# Patient Record
Sex: FEMALE | Race: WHITE | Employment: UNEMPLOYED | URBAN - METROPOLITAN AREA
[De-identification: names, ages, dates, MRNs, and addresses within clinical notes are randomized per-mention and may not be internally consistent; named-entity substitution may affect disease eponyms.]

---

## 2018-01-02 ENCOUNTER — HOSPITAL ENCOUNTER (OUTPATIENT)
Age: 73
Setting detail: SPECIMEN
Discharge: HOME | End: 2018-01-02
Payer: COMMERCIAL

## 2018-01-02 ENCOUNTER — OFFICE VISIT (OUTPATIENT)
Dept: INTERNAL MEDICINE | Age: 73
End: 2018-01-02
Payer: COMMERCIAL

## 2018-01-02 VITALS
HEART RATE: 104 BPM | BODY MASS INDEX: 30.73 KG/M2 | DIASTOLIC BLOOD PRESSURE: 76 MMHG | SYSTOLIC BLOOD PRESSURE: 152 MMHG | WEIGHT: 180 LBS | HEIGHT: 64 IN

## 2018-01-02 DIAGNOSIS — R79.89 LOW VITAMIN D LEVEL: ICD-10-CM

## 2018-01-02 DIAGNOSIS — D69.6 THROMBOCYTOPENIA (HCC): ICD-10-CM

## 2018-01-02 DIAGNOSIS — F33.9 EPISODE OF RECURRENT MAJOR DEPRESSIVE DISORDER, UNSPECIFIED DEPRESSION EPISODE SEVERITY (HCC): ICD-10-CM

## 2018-01-02 DIAGNOSIS — E78.5 HYPERLIPIDEMIA, UNSPECIFIED HYPERLIPIDEMIA TYPE: ICD-10-CM

## 2018-01-02 DIAGNOSIS — E11.9 TYPE 2 DIABETES MELLITUS WITHOUT COMPLICATION, WITHOUT LONG-TERM CURRENT USE OF INSULIN (HCC): ICD-10-CM

## 2018-01-02 DIAGNOSIS — I10 ESSENTIAL HYPERTENSION: ICD-10-CM

## 2018-01-02 DIAGNOSIS — D64.9 ANEMIA, UNSPECIFIED TYPE: ICD-10-CM

## 2018-01-02 DIAGNOSIS — R29.6 RECURRENT FALLS: Primary | ICD-10-CM

## 2018-01-02 DIAGNOSIS — R53.1 GENERALIZED WEAKNESS: ICD-10-CM

## 2018-01-02 DIAGNOSIS — R29.6 RECURRENT FALLS: ICD-10-CM

## 2018-01-02 LAB
ABSOLUTE EOS #: 0.17 K/UL (ref 0–0.44)
ABSOLUTE IMMATURE GRANULOCYTE: <0.03 K/UL (ref 0–0.3)
ABSOLUTE LYMPH #: 1.24 K/UL (ref 1.1–3.7)
ABSOLUTE MONO #: 0.65 K/UL (ref 0.1–1.2)
ALBUMIN SERPL-MCNC: 3 G/DL (ref 3.5–5.2)
ALBUMIN/GLOBULIN RATIO: 0.8 (ref 1–2.5)
ALP BLD-CCNC: 98 U/L (ref 35–104)
ALT SERPL-CCNC: 15 U/L (ref 5–33)
ANION GAP SERPL CALCULATED.3IONS-SCNC: 14 MMOL/L (ref 9–17)
AST SERPL-CCNC: 36 U/L
BASOPHILS # BLD: 1 % (ref 0–2)
BASOPHILS ABSOLUTE: 0.06 K/UL (ref 0–0.2)
BILIRUB SERPL-MCNC: 3.82 MG/DL (ref 0.3–1.2)
BUN BLDV-MCNC: 9 MG/DL (ref 8–23)
BUN/CREAT BLD: ABNORMAL (ref 9–20)
CALCIUM SERPL-MCNC: 8.4 MG/DL (ref 8.6–10.4)
CHLORIDE BLD-SCNC: 103 MMOL/L (ref 98–107)
CO2: 22 MMOL/L (ref 20–31)
CREAT SERPL-MCNC: 0.97 MG/DL (ref 0.5–0.9)
CREATININE URINE: 403.4 MG/DL (ref 28–217)
DIFFERENTIAL TYPE: ABNORMAL
EOSINOPHILS RELATIVE PERCENT: 3 % (ref 1–4)
FOLATE: <2 NG/ML
GFR AFRICAN AMERICAN: >60 ML/MIN
GFR NON-AFRICAN AMERICAN: 56 ML/MIN
GFR SERPL CREATININE-BSD FRML MDRD: ABNORMAL ML/MIN/{1.73_M2}
GFR SERPL CREATININE-BSD FRML MDRD: ABNORMAL ML/MIN/{1.73_M2}
GLUCOSE BLD-MCNC: 145 MG/DL (ref 70–99)
HBA1C MFR BLD: 5.5 %
HCT VFR BLD CALC: 36.6 % (ref 36.3–47.1)
HEMOGLOBIN: 12.3 G/DL (ref 11.9–15.1)
IMMATURE GRANULOCYTES: 0 %
LYMPHOCYTES # BLD: 20 % (ref 24–43)
MCH RBC QN AUTO: 35.1 PG (ref 25.2–33.5)
MCHC RBC AUTO-ENTMCNC: 33.6 G/DL (ref 28.4–34.8)
MCV RBC AUTO: 104.6 FL (ref 82.6–102.9)
MICROALBUMIN/CREAT 24H UR: 44 MG/L
MICROALBUMIN/CREAT UR-RTO: 11 MCG/MG CREAT
MONOCYTES # BLD: 10 % (ref 3–12)
PDW BLD-RTO: 16.3 % (ref 11.8–14.4)
PLATELET # BLD: 57 K/UL (ref 138–453)
PLATELET ESTIMATE: ABNORMAL
PMV BLD AUTO: 12.1 FL (ref 8.1–13.5)
POTASSIUM SERPL-SCNC: 4.1 MMOL/L (ref 3.7–5.3)
RBC # BLD: 3.5 M/UL (ref 3.95–5.11)
RBC # BLD: ABNORMAL 10*6/UL
SEG NEUTROPHILS: 66 % (ref 36–65)
SEGMENTED NEUTROPHILS ABSOLUTE COUNT: 4.13 K/UL (ref 1.5–8.1)
SODIUM BLD-SCNC: 139 MMOL/L (ref 135–144)
TOTAL PROTEIN: 6.7 G/DL (ref 6.4–8.3)
TSH SERPL DL<=0.05 MIU/L-ACNC: 2.35 MIU/L (ref 0.3–5)
VITAMIN B-12: 1887 PG/ML (ref 232–1245)
VITAMIN D 25-HYDROXY: 26.6 NG/ML (ref 30–100)
WBC # BLD: 6.3 K/UL (ref 3.5–11.3)
WBC # BLD: ABNORMAL 10*3/UL

## 2018-01-02 PROCEDURE — 80053 COMPREHEN METABOLIC PANEL: CPT

## 2018-01-02 PROCEDURE — 82306 VITAMIN D 25 HYDROXY: CPT

## 2018-01-02 PROCEDURE — 85025 COMPLETE CBC W/AUTO DIFF WBC: CPT

## 2018-01-02 PROCEDURE — 83036 HEMOGLOBIN GLYCOSYLATED A1C: CPT | Performed by: INTERNAL MEDICINE

## 2018-01-02 PROCEDURE — 82570 ASSAY OF URINE CREATININE: CPT

## 2018-01-02 PROCEDURE — 82607 VITAMIN B-12: CPT

## 2018-01-02 PROCEDURE — 99214 OFFICE O/P EST MOD 30 MIN: CPT | Performed by: INTERNAL MEDICINE

## 2018-01-02 PROCEDURE — 36415 COLL VENOUS BLD VENIPUNCTURE: CPT

## 2018-01-02 PROCEDURE — 82746 ASSAY OF FOLIC ACID SERUM: CPT

## 2018-01-02 PROCEDURE — 84443 ASSAY THYROID STIM HORMONE: CPT

## 2018-01-02 PROCEDURE — 82043 UR ALBUMIN QUANTITATIVE: CPT

## 2018-01-02 ASSESSMENT — ENCOUNTER SYMPTOMS
NAUSEA: 0
ABDOMINAL PAIN: 0
SHORTNESS OF BREATH: 0
COUGH: 0
SPUTUM PRODUCTION: 0
EYE REDNESS: 0
BLURRED VISION: 0

## 2018-01-02 NOTE — PROGRESS NOTES
and physical therapy and a life alert button but patient has refused in the past.  Patient has 2 children one of whom is adopted. She has no contact with them for years. The only person she is in contact with is a niece who lives in [place]. She has living will . She wants no CPR or intubation. She had it notarized. She does not want intubation even for reversible reasons. Results for POC orders placed in visit on 01/02/18   POCT glycosylated hemoglobin (Hb A1C)   Result Value Ref Range    Hemoglobin A1C 5.5 %       Liver u/s:   IMPRESSION:    1. Echogenic foci in the gallbladder, may reflect non-shadowing calculi. 2. Slightly hypoechoic hepatic parenchyma, nonspecific though can be seen    in the setting of hepatitis. Please correlate clinically. No focal    lesions are demonstrated. 3. Spleen is normal in size and otherwise sonographically unremarkable.      Pathology review of blood: PERIPHERAL EXAMINATION BY PATHOLOGIST    PLATELETS: Normal.     LEUKOCYTES: Normal.    ERYTHROCYTES: Normal.      Results for POC orders placed in visit on 01/02/18   POCT glycosylated hemoglobin (Hb A1C)   Result Value Ref Range    Hemoglobin A1C 5.5 %         Past Medical History:   Diagnosis Date    Anxiety     Arthritis     Depression     HLD (hyperlipidemia) 2/28/2014    HTN (hypertension) 2/28/2014    Obesity     Osteoarthritis     knee    Osteopenia     dexa 2006 left hip    Type II or unspecified type diabetes mellitus without mention of complication, not stated as uncontrolled        Past Surgical History:   Procedure Laterality Date    SHOULDER SURGERY Right     metal plate         ALLERGIES    No Known Allergies    MEDICATIONS:      Current Outpatient Prescriptions on File Prior to Visit   Medication Sig Dispense Refill    sertraline (ZOLOFT) 100 MG tablet Take 100 mg by mouth 2 times daily.  traZODone (DESYREL) 50 MG tablet Take 50 mg by mouth as needed for Sleep.       acetaminophen Component Value Date    TSH 1.99 02/28/2014      URINE ANALYSIS: No results found for: LABURIN  ASSESSMENT AND PLAN:    1. Recurrent falls  Needs PT  Walker  Help with nutrition    - [address]  - Comprehensive Metabolic Panel; Future  - TSH with Reflex; Future  - Vitamin D 25 Hydroxy; Future    2. Type 2 diabetes mellitus without complication, without long-term current use of insulin (HCC)    - POCT glycosylated hemoglobin (Hb A1C)  - Microalbumin, Ur; Future  - [address]  - Comprehensive Metabolic Panel; Future  - Vitamin B12 & Folate; Future  - TSH with Reflex; Future    3. Essential hypertension    - [address]  - Comprehensive Metabolic Panel; Future  - TSH with Reflex; Future    4. Generalized weakness    - Vitamin D 25 Hydroxy; Future    5. Thrombocytopenia (HCC)  Chronic    - CBC With Auto Differential; Future  - Vitamin B12 & Folate; Future    6. Hyperlipidemia, unspecified hyperlipidemia type      7. Episode of recurrent major depressive disorder, unspecified depression episode severity [facility])    - [address]    8. Anemia, unspecified type    - CBC With Auto Differential; Future    9. Low vitamin D level    - Vitamin D 25 Hydroxy; Future          FOLLOW UP AND INSTRUCTIONS:   Return in about 4 weeks (around 1/30/2018). 1. Nina received counseling on the following healthy behaviors: nutrition, exercise and medication adherence    2. Reviewed prior labs and health maintenance. 3. Discussed use, benefit, and side effects of prescribed medications. Barriers to medication compliance addressed. All patient questions answered. Pt voiced understanding.        Estefany Conley  Attending Physician, [address] [place] [address], Internal Medicine Residency Program  [address] [place] [address]  1/2/2018, 1:43 PM

## 2018-01-02 NOTE — PROGRESS NOTES
Visit Information    Have you changed or started any medications since your last visit including any over-the-counter medicines, vitamins, or herbal medicines? no   Are you having any side effects from any of your medications? -  no  Have you stopped taking any of your medications? Is so, why? -  no    Have you seen any other physician or provider since your last visit? No  Have you had any other diagnostic tests since your last visit? No  Have you been seen in the emergency room and/or had an admission to a hospital since we last saw you? No  Have you had your routine dental cleaning in the past 6 months? no    Have you activated your [employer] account? If not, what are your barriers?  No: declined      Patient Care Team:  Sarah Rodriguez MD as PCP - Scoo Caldwell MD as Consulting Physician (Hematology and Oncology)    Medical History Review  Past Medical, Family, and Social History reviewed and does contribute to the patient presenting condition    Health Maintenance   Topic Date Due    Diabetic foot exam  09/12/1955    Diabetic retinal exam  09/12/1955    Colon cancer screen colonoscopy  09/12/1993    Lipid screen  12/29/2015    Diabetic hemoglobin A1C test  05/19/2016    Diabetic microalbuminuria test  05/19/2016    Breast cancer screen  09/03/2016    Pneumococcal low/med risk (2 of 2 - PPSV23) 09/21/2016    DTaP/Tdap/Td vaccine (1 - Tdap) 07/02/2018 (Originally 9/12/1964)    Zostavax vaccine  Addressed    DEXA (modify frequency per FRAX score)  Addressed    Flu vaccine  Completed

## 2018-01-03 RX ORDER — FOLIC ACID 1 MG/1
1 TABLET ORAL DAILY
Qty: 30 TABLET | Refills: 5 | Status: SHIPPED | OUTPATIENT
Start: 2018-01-03

## 2018-01-07 ASSESSMENT — ENCOUNTER SYMPTOMS
CONSTIPATION: 0
BACK PAIN: 0
DIARRHEA: 0
BLOOD IN STOOL: 0

## 2018-01-31 ENCOUNTER — APPOINTMENT (OUTPATIENT)
Dept: GENERAL RADIOLOGY | Age: 73
DRG: 086 | End: 2018-01-31
Payer: COMMERCIAL

## 2018-01-31 ENCOUNTER — HOSPITAL ENCOUNTER (EMERGENCY)
Age: 73
Discharge: HOME | DRG: 086 | End: 2018-01-31
Attending: EMERGENCY MEDICINE
Payer: COMMERCIAL

## 2018-01-31 VITALS
DIASTOLIC BLOOD PRESSURE: 81 MMHG | HEART RATE: 98 BPM | SYSTOLIC BLOOD PRESSURE: 134 MMHG | OXYGEN SATURATION: 99 % | RESPIRATION RATE: 18 BRPM | TEMPERATURE: 97.9 F

## 2018-01-31 DIAGNOSIS — S42.212A: Primary | ICD-10-CM

## 2018-01-31 PROCEDURE — 73030 X-RAY EXAM OF SHOULDER: CPT

## 2018-01-31 PROCEDURE — 73080 X-RAY EXAM OF ELBOW: CPT

## 2018-01-31 PROCEDURE — 73060 X-RAY EXAM OF HUMERUS: CPT

## 2018-01-31 PROCEDURE — 99284 EMERGENCY DEPT VISIT MOD MDM: CPT

## 2018-01-31 PROCEDURE — 6370000000 HC RX 637 (ALT 250 FOR IP): Performed by: EMERGENCY MEDICINE

## 2018-01-31 RX ORDER — IBUPROFEN 800 MG/1
800 TABLET ORAL EVERY 8 HOURS PRN
Qty: 30 TABLET | Refills: 0 | Status: SHIPPED | OUTPATIENT
Start: 2018-01-31

## 2018-01-31 RX ORDER — ACETAMINOPHEN 325 MG/1
650 TABLET ORAL ONCE
Status: COMPLETED | OUTPATIENT
Start: 2018-01-31 | End: 2018-01-31

## 2018-01-31 RX ORDER — HYDROCODONE BITARTRATE AND ACETAMINOPHEN 5; 325 MG/1; MG/1
1 TABLET ORAL EVERY 6 HOURS PRN
Qty: 12 TABLET | Refills: 0 | Status: ON HOLD | OUTPATIENT
Start: 2018-01-31 | End: 2018-02-06

## 2018-01-31 RX ADMIN — ACETAMINOPHEN 650 MG: 325 TABLET ORAL at 16:42

## 2018-01-31 ASSESSMENT — ENCOUNTER SYMPTOMS
NAUSEA: 0
ABDOMINAL PAIN: 0
CHEST TIGHTNESS: 0
VOMITING: 0
DIARRHEA: 0
CONSTIPATION: 0
SORE THROAT: 0
SHORTNESS OF BREATH: 0

## 2018-01-31 ASSESSMENT — PAIN SCALES - GENERAL: PAINLEVEL_OUTOF10: 6

## 2018-01-31 NOTE — PROGRESS NOTES
I signed up for this patient in error. I did not evaluate or participate in the care of this patient.      Marcela Maria, DO  Emergency Medicine Resident

## 2018-01-31 NOTE — ED PROVIDER NOTES
Main Topics    Smoking status: Former Smoker     Packs/day: 2.00     Years: 25.00    Smokeless tobacco: Never Used    Alcohol use No    Drug use: No      Comment: quit 1985    Sexual activity: Not on file     Other Topics Concern    Not on file     Social History Narrative    No narrative on file       Family History   Problem Relation Age of Onset    High Blood Pressure Mother     Other Father      cirrhosis       Allergies:  Review of patient's allergies indicates no known allergies. Home Medications:  Prior to Admission medications    Medication Sig Start Date End Date Taking? Authorizing Provider   HYDROcodone-acetaminophen (NORCO) 5-325 MG per tablet Take 1 tablet by mouth every 6 hours as needed for Pain for up to 7 days. 1/31/18 2/7/18 Yes Darius Holt MD   ibuprofen (ADVIL;MOTRIN) 800 MG tablet Take 1 tablet by mouth every 8 hours as needed for Pain 1/31/18  Yes Darius Holt MD   folic acid (FOLVITE) 1 MG tablet Take 1 tablet by mouth daily 1/3/18   Monica Lemon MD   Cholecalciferol 2000 units TABS Once daily 1/3/18   Monica Lemon MD   sertraline (ZOLOFT) 100 MG tablet Take 100 mg by mouth 2 times daily. Historical Provider, MD   traZODone (DESYREL) 50 MG tablet Take 50 mg by mouth as needed for Sleep. Historical Provider, MD   acetaminophen (TYLENOL) 500 MG tablet Take 500 mg by mouth every 4 hours as needed for Pain. Historical Provider, MD       REVIEW OF SYSTEMS    (2-9 systems for level 4, 10 or more for level 5)      Review of Systems   Constitutional: Negative for chills, diaphoresis and fever. HENT: Negative for congestion and sore throat. Respiratory: Negative for chest tightness and shortness of breath. Cardiovascular: Negative for chest pain and leg swelling. Gastrointestinal: Negative for abdominal pain, constipation, diarrhea, nausea and vomiting. Genitourinary: Negative for difficulty urinating, dysuria, frequency and urgency.    Musculoskeletal: This Encounter   Medications    acetaminophen (TYLENOL) tablet 650 mg    HYDROcodone-acetaminophen (NORCO) 5-325 MG per tablet     Sig: Take 1 tablet by mouth every 6 hours as needed for Pain for up to 7 days. Dispense:  12 tablet     Refill:  0    ibuprofen (ADVIL;MOTRIN) 800 MG tablet     Sig: Take 1 tablet by mouth every 8 hours as needed for Pain     Dispense:  30 tablet     Refill:  0       DDX: Fracture, dislocation, subluxation, ligamentous injury, rotator cuff injury    DIAGNOSTIC RESULTS / [address] [place] [address] / [facility]     LABS:  No results found for this visit on 01/31/18. IMPRESSION: 77-year-old female presenting with left arm pain. Patient denies any falls today. Denies any numbness or weakness in her arm. Neurovascularly intact on exam although patient does have decreased range of motion the left shoulder with abduction. Possible fracture or rotator cuff injury. May be a social component as patient does live alone. While  evaluate. Plan is for x-ray of the left humerus and left elbow, analgesia and reassessment. Will consult orthopedic surgery if there is a fracture. Likely discharge. RADIOLOGY:  Xr Humerus Left (min 2 Views)    Result Date: 1/31/2018  EXAMINATION: AP AND LATERAL VIEWS OF THE LEFT HUMERUS; 3 VIEWS OF THE LEFT ELBOW 1/31/2018 5:16 pm COMPARISON: None. HISTORY: ORDERING SYSTEM PROVIDED HISTORY: left arm pain just proximal to the elbow TECHNOLOGIST PROVIDED HISTORY: Reason for exam:->left arm pain just proximal to the elbow Ordering Physician Provided Reason for Exam: Arm Injury (pt states she almost fell but lowered herself to ground with controlled movements, pt states she wasn't able to get up until someone helped her up and was back to home.) Acuity: Acute Type of Exam: Unknown FINDINGS: There is a comminuted foreshortened fracture through the surgical neck of the left humerus.   There are hypertrophic degenerative changes of the acromioclavicular joint No joint effusion identified. Joint spaces appear maintained. Visualized osseous structures are moderately demineralized. Soft tissues have a normal radiographic appearance. 1. Impacted comminuted fracture through the surgical neck of the left humerus. 2. No radiographic evidence for acute osseous abnormality of the left elbow. Xr Elbow Left (min 3 Views)    Result Date: 1/31/2018  EXAMINATION: AP AND LATERAL VIEWS OF THE LEFT HUMERUS; 3 VIEWS OF THE LEFT ELBOW 1/31/2018 5:16 pm COMPARISON: None. HISTORY: ORDERING SYSTEM PROVIDED HISTORY: left arm pain just proximal to the elbow TECHNOLOGIST PROVIDED HISTORY: Reason for exam:->left arm pain just proximal to the elbow Ordering Physician Provided Reason for Exam: Arm Injury (pt states she almost fell but lowered herself to ground with controlled movements, pt states she wasn't able to get up until someone helped her up and was back to home.) Acuity: Acute Type of Exam: Unknown FINDINGS: There is a comminuted foreshortened fracture through the surgical neck of the left humerus. There are hypertrophic degenerative changes of the acromioclavicular joint No joint effusion identified. Joint spaces appear maintained. Visualized osseous structures are moderately demineralized. Soft tissues have a normal radiographic appearance. 1. Impacted comminuted fracture through the surgical neck of the left humerus. 2. No radiographic evidence for acute osseous abnormality of the left elbow. EKG  None    All EKG's are interpreted by the Emergency Department Physician who either signs or Co-signs this chart in the absence of a cardiologist.    EMERGENCY DEPARTMENT COURSE:  ED Course as of Jan 31 1805   Wed Jan 31, 2018   1719 Spoke with orthopedic surgery to discuss care plan and need for evaluation as patient does have fractured left humerus. Ortho will evaluate the patient in emergency room.   Patient offered narcotic pain medication for her fracture but states she is not in any pain at this time. Patient declined. [DS]   6489 With the surgery did evaluate patient. They state patient be placed in a sling and discharged to follow up outpatient with Dr. Suleiman Mcghee in 1 week. [DS]      ED Course User Index  [DS] Guillermo Dahl MD           PROCEDURES:  None    CONSULTS:  IP CONSULT TO SOCIAL WORK  IP CONSULT TO ORTHOPEDIC SURGERY    CRITICAL CARE:  None    FINAL IMPRESSION      1. Closed fracture of surgical neck of humerus, left, closed, initial encounter          DISPOSITION / PLAN     DISPOSITION Decision To Discharge 01/31/2018 06:02:41 PM      PATIENT REFERRED TO:  MD Yue Catalan [facility] [address] [facility] 400 [facility] 909 [phone]    Call in 1 day  For a follow up appointment. Amalia Chua DO  [facility] [address], [place] [address]  [phone]    Call in 1 day  For a follow up appointment in 1 week. DISCHARGE MEDICATIONS:  New Prescriptions    HYDROCODONE-ACETAMINOPHEN (NORCO) 5-325 MG PER TABLET    Take 1 tablet by mouth every 6 hours as needed for Pain for up to 7 days.     IBUPROFEN (ADVIL;MOTRIN) 800 MG TABLET    Take 1 tablet by mouth every 8 hours as needed for Pain       Guillermo Dahl MD  Emergency Medicine Resident    (Please note that portions of this note were completed with a voice recognition program.  Efforts were made to edit the dictations but occasionally words ar      Guillermo Dahl MD  Resident  01/31/18 0765

## 2018-01-31 NOTE — CONSULTS
Admission:   Prior to Admission medications    Medication Sig Start Date End Date Taking? Authorizing Provider   folic acid (FOLVITE) 1 MG tablet Take 1 tablet by mouth daily 1/3/18   Veda Ortiz MD   Cholecalciferol 2000 units TABS Once daily 1/3/18   Veda Ortiz MD   sertraline (ZOLOFT) 100 MG tablet Take 100 mg by mouth 2 times daily. Historical Provider, MD   traZODone (DESYREL) 50 MG tablet Take 50 mg by mouth as needed for Sleep. Historical Provider, MD   acetaminophen (TYLENOL) 500 MG tablet Take 500 mg by mouth every 4 hours as needed for Pain. Historical Provider, MD     Allergies:    Review of patient's allergies indicates no known allergies. Social History:   Social History     Social History    Marital status:      Spouse name: N/A    Number of children: N/A    Years of education: N/A     Social History Main Topics    Smoking status: Former Smoker     Packs/day: 2.00     Years: 25.00    Smokeless tobacco: Never Used    Alcohol use No    Drug use: No      Comment: quit 1985    Sexual activity: Not Asked     Other Topics Concern    None     Social History Narrative    None     Family History:  Family History   Problem Relation Age of Onset    High Blood Pressure Mother     Other Father      cirrhosis     REVIEW OF SYSTEMS:    Constitutional: Negative for fever and chills. HENT: Negative for congestion. Eyes: Negative for blurred vision and double vision. Respiratory: Negative for cough, shortness of breath and wheezing. Cardiovascular: Negative for chest pain and palpitations. Gastrointestinal: Negative for nausea. Negative for vomiting. Musculoskeletal: Positive for myalgias and left shoulder pain. Skin: Negative for itching and rash. Neurological: Negative for dizziness, sensory change and headaches. Psychiatric/Behavioral: Negative for depression and suicidal ideas.      PHYSICAL EXAM:  /81   Pulse 98   Temp 97.9 °F (36.6 °C)   Resp

## 2018-02-01 ENCOUNTER — APPOINTMENT (OUTPATIENT)
Dept: GENERAL RADIOLOGY | Age: 73
DRG: 086 | End: 2018-02-01
Payer: COMMERCIAL

## 2018-02-01 ENCOUNTER — APPOINTMENT (OUTPATIENT)
Dept: CT IMAGING | Age: 73
DRG: 086 | End: 2018-02-01
Payer: COMMERCIAL

## 2018-02-01 ENCOUNTER — APPOINTMENT (OUTPATIENT)
Dept: ULTRASOUND IMAGING | Age: 73
DRG: 086 | End: 2018-02-01
Payer: COMMERCIAL

## 2018-02-01 ENCOUNTER — HOSPITAL ENCOUNTER (INPATIENT)
Age: 73
LOS: 5 days | Discharge: NURSING, REHAB OR LONG-TERM CARE | DRG: 086 | End: 2018-02-06
Attending: EMERGENCY MEDICINE | Admitting: INTERNAL MEDICINE
Payer: COMMERCIAL

## 2018-02-01 DIAGNOSIS — S42.212A: ICD-10-CM

## 2018-02-01 DIAGNOSIS — E87.20 LACTIC ACIDOSIS: ICD-10-CM

## 2018-02-01 DIAGNOSIS — S42.215A CLOSED NONDISPLACED FRACTURE OF SURGICAL NECK OF LEFT HUMERUS, UNSPECIFIED FRACTURE MORPHOLOGY, INITIAL ENCOUNTER: ICD-10-CM

## 2018-02-01 DIAGNOSIS — N39.0 SEPSIS DUE TO URINARY TRACT INFECTION (HCC): Primary | ICD-10-CM

## 2018-02-01 DIAGNOSIS — A41.9 SEPSIS DUE TO URINARY TRACT INFECTION (HCC): Primary | ICD-10-CM

## 2018-02-01 DIAGNOSIS — I61.9 INTRAPARENCHYMAL HEMORRHAGE OF BRAIN (HCC): ICD-10-CM

## 2018-02-01 DIAGNOSIS — D50.9 IRON DEFICIENCY ANEMIA, UNSPECIFIED IRON DEFICIENCY ANEMIA TYPE: ICD-10-CM

## 2018-02-01 PROBLEM — R79.89 ELEVATED LFTS: Status: ACTIVE | Noted: 2018-02-01

## 2018-02-01 PROBLEM — R29.6 RECURRENT FALLS: Status: ACTIVE | Noted: 2018-02-01

## 2018-02-01 PROBLEM — D69.6 THROMBOCYTOPENIA (HCC): Status: ACTIVE | Noted: 2018-02-01

## 2018-02-01 LAB
-: ABNORMAL
ABSOLUTE EOS #: <0.03 K/UL (ref 0–0.44)
ABSOLUTE IMMATURE GRANULOCYTE: 0.11 K/UL (ref 0–0.3)
ABSOLUTE LYMPH #: 0.84 K/UL (ref 1.1–3.7)
ABSOLUTE MONO #: 1.03 K/UL (ref 0.1–1.2)
ALBUMIN SERPL-MCNC: 2.9 G/DL (ref 3.5–5.2)
ALBUMIN/GLOBULIN RATIO: 0.9 (ref 1–2.5)
ALP BLD-CCNC: 83 U/L (ref 35–104)
ALT SERPL-CCNC: 20 U/L (ref 5–33)
AMORPHOUS: ABNORMAL
ANION GAP SERPL CALCULATED.3IONS-SCNC: 19 MMOL/L (ref 9–17)
AST SERPL-CCNC: 75 U/L
BACTERIA: ABNORMAL
BASOPHILS # BLD: 0 % (ref 0–2)
BASOPHILS ABSOLUTE: <0.03 K/UL (ref 0–0.2)
BILIRUB SERPL-MCNC: 3.77 MG/DL (ref 0.3–1.2)
BILIRUBIN URINE: ABNORMAL
BUN BLDV-MCNC: 18 MG/DL (ref 8–23)
BUN/CREAT BLD: ABNORMAL (ref 9–20)
CALCIUM SERPL-MCNC: 8.9 MG/DL (ref 8.6–10.4)
CASTS UA: ABNORMAL /LPF (ref 0–2)
CHLORIDE BLD-SCNC: 98 MMOL/L (ref 98–107)
CO2: 15 MMOL/L (ref 20–31)
COLOR: ABNORMAL
CREAT SERPL-MCNC: 0.89 MG/DL (ref 0.5–0.9)
CRYSTALS, UA: ABNORMAL /HPF
DIFFERENTIAL TYPE: ABNORMAL
EKG ATRIAL RATE: 98 BPM
EKG P AXIS: 27 DEGREES
EKG P-R INTERVAL: 132 MS
EKG Q-T INTERVAL: 352 MS
EKG QRS DURATION: 64 MS
EKG QTC CALCULATION (BAZETT): 449 MS
EKG R AXIS: -17 DEGREES
EKG T AXIS: 44 DEGREES
EKG VENTRICULAR RATE: 98 BPM
EOSINOPHILS RELATIVE PERCENT: 0 % (ref 1–4)
EPITHELIAL CELLS UA: ABNORMAL /HPF (ref 0–5)
GFR AFRICAN AMERICAN: >60 ML/MIN
GFR NON-AFRICAN AMERICAN: >60 ML/MIN
GFR SERPL CREATININE-BSD FRML MDRD: ABNORMAL ML/MIN/{1.73_M2}
GFR SERPL CREATININE-BSD FRML MDRD: ABNORMAL ML/MIN/{1.73_M2}
GLUCOSE BLD-MCNC: 179 MG/DL (ref 70–99)
GLUCOSE URINE: NEGATIVE
HCT VFR BLD CALC: 28.2 % (ref 36.3–47.1)
HEMOGLOBIN: 10.2 G/DL (ref 11.9–15.1)
IMMATURE GRANULOCYTES: 1 %
INR BLD: 1.5
KETONES, URINE: NEGATIVE
LACTIC ACID, WHOLE BLOOD: 3.5 MMOL/L (ref 0.7–2.1)
LACTIC ACID, WHOLE BLOOD: 5.9 MMOL/L (ref 0.7–2.1)
LACTIC ACID: ABNORMAL MMOL/L
LEUKOCYTE ESTERASE, URINE: ABNORMAL
LYMPHOCYTES # BLD: 6 % (ref 24–43)
MCH RBC QN AUTO: 37.8 PG (ref 25.2–33.5)
MCHC RBC AUTO-ENTMCNC: 36.2 G/DL (ref 28.4–34.8)
MCV RBC AUTO: 104.4 FL (ref 82.6–102.9)
MONOCYTES # BLD: 7 % (ref 3–12)
MUCUS: ABNORMAL
NITRITE, URINE: POSITIVE
NRBC AUTOMATED: 0 PER 100 WBC
OTHER OBSERVATIONS UA: ABNORMAL
PARTIAL THROMBOPLASTIN TIME: 31.5 SEC (ref 21.3–31.3)
PDW BLD-RTO: 18.6 % (ref 11.8–14.4)
PH UA: 5 (ref 5–8)
PLATELET # BLD: 67 K/UL (ref 138–453)
PLATELET ESTIMATE: ABNORMAL
PMV BLD AUTO: 12.4 FL (ref 8.1–13.5)
POC TROPONIN I: 0.01 NG/ML (ref 0–0.1)
POC TROPONIN I: 0.06 NG/ML (ref 0–0.1)
POC TROPONIN INTERP: NORMAL
POC TROPONIN INTERP: NORMAL
POTASSIUM SERPL-SCNC: 4.5 MMOL/L (ref 3.7–5.3)
PROTEIN UA: ABNORMAL
PROTHROMBIN TIME: 16.6 SEC (ref 9.4–12.6)
RBC # BLD: 2.7 M/UL (ref 3.95–5.11)
RBC # BLD: ABNORMAL 10*6/UL
RBC UA: ABNORMAL /HPF (ref 0–2)
RENAL EPITHELIAL, UA: ABNORMAL /HPF
SEG NEUTROPHILS: 86 % (ref 36–65)
SEGMENTED NEUTROPHILS ABSOLUTE COUNT: 12.19 K/UL (ref 1.5–8.1)
SODIUM BLD-SCNC: 132 MMOL/L (ref 135–144)
SPECIFIC GRAVITY UA: 1.04 (ref 1–1.03)
TOTAL PROTEIN: 6.3 G/DL (ref 6.4–8.3)
TRICHOMONAS: ABNORMAL
TURBIDITY: ABNORMAL
URINE HGB: ABNORMAL
UROBILINOGEN, URINE: NORMAL
WBC # BLD: 14.2 K/UL (ref 3.5–11.3)
WBC # BLD: ABNORMAL 10*3/UL
WBC UA: ABNORMAL /HPF (ref 0–5)
YEAST: ABNORMAL

## 2018-02-01 PROCEDURE — 76705 ECHO EXAM OF ABDOMEN: CPT

## 2018-02-01 PROCEDURE — 6370000000 HC RX 637 (ALT 250 FOR IP): Performed by: STUDENT IN AN ORGANIZED HEALTH CARE EDUCATION/TRAINING PROGRAM

## 2018-02-01 PROCEDURE — 82746 ASSAY OF FOLIC ACID SERUM: CPT

## 2018-02-01 PROCEDURE — 87040 BLOOD CULTURE FOR BACTERIA: CPT

## 2018-02-01 PROCEDURE — 72125 CT NECK SPINE W/O DYE: CPT

## 2018-02-01 PROCEDURE — 84484 ASSAY OF TROPONIN QUANT: CPT

## 2018-02-01 PROCEDURE — 82607 VITAMIN B-12: CPT

## 2018-02-01 PROCEDURE — 70450 CT HEAD/BRAIN W/O DYE: CPT

## 2018-02-01 PROCEDURE — 83605 ASSAY OF LACTIC ACID: CPT

## 2018-02-01 PROCEDURE — 85610 PROTHROMBIN TIME: CPT

## 2018-02-01 PROCEDURE — 36415 COLL VENOUS BLD VENIPUNCTURE: CPT

## 2018-02-01 PROCEDURE — 80074 ACUTE HEPATITIS PANEL: CPT

## 2018-02-01 PROCEDURE — 84443 ASSAY THYROID STIM HORMONE: CPT

## 2018-02-01 PROCEDURE — 83540 ASSAY OF IRON: CPT

## 2018-02-01 PROCEDURE — 6360000002 HC RX W HCPCS: Performed by: EMERGENCY MEDICINE

## 2018-02-01 PROCEDURE — 2060000000 HC ICU INTERMEDIATE R&B

## 2018-02-01 PROCEDURE — 83550 IRON BINDING TEST: CPT

## 2018-02-01 PROCEDURE — 94762 N-INVAS EAR/PLS OXIMTRY CONT: CPT

## 2018-02-01 PROCEDURE — 81001 URINALYSIS AUTO W/SCOPE: CPT

## 2018-02-01 PROCEDURE — 99223 1ST HOSP IP/OBS HIGH 75: CPT | Performed by: INTERNAL MEDICINE

## 2018-02-01 PROCEDURE — 2580000003 HC RX 258: Performed by: EMERGENCY MEDICINE

## 2018-02-01 PROCEDURE — 87086 URINE CULTURE/COLONY COUNT: CPT

## 2018-02-01 PROCEDURE — 80053 COMPREHEN METABOLIC PANEL: CPT

## 2018-02-01 PROCEDURE — 85025 COMPLETE CBC W/AUTO DIFF WBC: CPT

## 2018-02-01 PROCEDURE — 2580000003 HC RX 258: Performed by: INTERNAL MEDICINE

## 2018-02-01 PROCEDURE — 2580000003 HC RX 258: Performed by: STUDENT IN AN ORGANIZED HEALTH CARE EDUCATION/TRAINING PROGRAM

## 2018-02-01 PROCEDURE — 93005 ELECTROCARDIOGRAM TRACING: CPT

## 2018-02-01 PROCEDURE — 85730 THROMBOPLASTIN TIME PARTIAL: CPT

## 2018-02-01 PROCEDURE — 71045 X-RAY EXAM CHEST 1 VIEW: CPT

## 2018-02-01 PROCEDURE — 99285 EMERGENCY DEPT VISIT HI MDM: CPT

## 2018-02-01 RX ORDER — SODIUM CHLORIDE 0.9 % (FLUSH) 0.9 %
10 SYRINGE (ML) INJECTION PRN
Status: DISCONTINUED | OUTPATIENT
Start: 2018-02-01 | End: 2018-02-01

## 2018-02-01 RX ORDER — SODIUM CHLORIDE 0.9 % (FLUSH) 0.9 %
10 SYRINGE (ML) INJECTION EVERY 12 HOURS SCHEDULED
Status: DISCONTINUED | OUTPATIENT
Start: 2018-02-01 | End: 2018-02-06 | Stop reason: HOSPADM

## 2018-02-01 RX ORDER — FOLIC ACID 1 MG/1
1 TABLET ORAL DAILY
Status: DISCONTINUED | OUTPATIENT
Start: 2018-02-01 | End: 2018-02-02

## 2018-02-01 RX ORDER — ACETAMINOPHEN 325 MG/1
650 TABLET ORAL EVERY 4 HOURS PRN
Status: DISCONTINUED | OUTPATIENT
Start: 2018-02-01 | End: 2018-02-01

## 2018-02-01 RX ORDER — SODIUM CHLORIDE 0.9 % (FLUSH) 0.9 %
10 SYRINGE (ML) INJECTION PRN
Status: DISCONTINUED | OUTPATIENT
Start: 2018-02-01 | End: 2018-02-06 | Stop reason: HOSPADM

## 2018-02-01 RX ORDER — ONDANSETRON 2 MG/ML
4 INJECTION INTRAMUSCULAR; INTRAVENOUS EVERY 6 HOURS PRN
Status: DISCONTINUED | OUTPATIENT
Start: 2018-02-01 | End: 2018-02-06 | Stop reason: HOSPADM

## 2018-02-01 RX ORDER — 0.9 % SODIUM CHLORIDE 0.9 %
30 INTRAVENOUS SOLUTION INTRAVENOUS ONCE
Status: DISCONTINUED | OUTPATIENT
Start: 2018-02-01 | End: 2018-02-06 | Stop reason: HOSPADM

## 2018-02-01 RX ORDER — ACETAMINOPHEN 325 MG/1
650 TABLET ORAL EVERY 4 HOURS PRN
Status: DISCONTINUED | OUTPATIENT
Start: 2018-02-01 | End: 2018-02-06 | Stop reason: HOSPADM

## 2018-02-01 RX ORDER — 0.9 % SODIUM CHLORIDE 0.9 %
30 INTRAVENOUS SOLUTION INTRAVENOUS ONCE
Status: COMPLETED | OUTPATIENT
Start: 2018-02-01 | End: 2018-02-01

## 2018-02-01 RX ORDER — SODIUM CHLORIDE 0.9 % (FLUSH) 0.9 %
10 SYRINGE (ML) INJECTION EVERY 12 HOURS SCHEDULED
Status: DISCONTINUED | OUTPATIENT
Start: 2018-02-01 | End: 2018-02-01

## 2018-02-01 RX ORDER — SODIUM CHLORIDE 9 MG/ML
INJECTION, SOLUTION INTRAVENOUS CONTINUOUS
Status: DISCONTINUED | OUTPATIENT
Start: 2018-02-01 | End: 2018-02-02

## 2018-02-01 RX ADMIN — FOLIC ACID 1 MG: 1 TABLET ORAL at 20:15

## 2018-02-01 RX ADMIN — CEFTRIAXONE SODIUM 1 G: 1 INJECTION, POWDER, FOR SOLUTION INTRAMUSCULAR; INTRAVENOUS at 13:26

## 2018-02-01 RX ADMIN — Medication 10 ML: at 20:16

## 2018-02-01 RX ADMIN — SODIUM CHLORIDE 2448 ML: 9 INJECTION, SOLUTION INTRAVENOUS at 12:58

## 2018-02-01 RX ADMIN — SODIUM CHLORIDE: 9 INJECTION, SOLUTION INTRAVENOUS at 16:37

## 2018-02-01 ASSESSMENT — PAIN SCALES - GENERAL
PAINLEVEL_OUTOF10: 0
PAINLEVEL_OUTOF10: 7

## 2018-02-01 ASSESSMENT — ENCOUNTER SYMPTOMS
ABDOMINAL PAIN: 0
COUGH: 0
RHINORRHEA: 0
VOMITING: 0
NAUSEA: 0
SHORTNESS OF BREATH: 0
WHEEZING: 0
COLOR CHANGE: 0

## 2018-02-01 ASSESSMENT — PAIN DESCRIPTION - ORIENTATION: ORIENTATION: LEFT

## 2018-02-01 ASSESSMENT — PAIN DESCRIPTION - LOCATION: LOCATION: ARM

## 2018-02-01 ASSESSMENT — PAIN DESCRIPTION - FREQUENCY: FREQUENCY: CONTINUOUS

## 2018-02-01 ASSESSMENT — PAIN DESCRIPTION - PAIN TYPE: TYPE: ACUTE PAIN

## 2018-02-01 NOTE — H&P
Chest was symmetrical without dullness to percussion. Breath sounds bilaterally were clear to auscultation. There were no wheezes, rhonchi or rales. There is no intercostal retraction or use of accessory muscles. No egophony noted. Cardiovascular: Ejection systolic murmur with radiation to carotids. , . Abdomen: Slightly rounded and soft without organomegaly. No rebound, rigidity or guarding was appreciated. Lymphatic: No lymphadenopathy. Musculoskeletal: Normal curvature of the spine. No gross muscle weakness, left arm in sling   Extremities:  No lower extremity edema, ulcerations, tenderness, varicosities or erythema. Muscle size, tone and strength are normal.  No involuntary movements are noted. Skin:  Dry , poor capillary refill ,poor turgor. No lesions or scars. No cyanosis or clubbing  Neurological/Psychiatric: The patient's general behavior, level of consciousness, thought content and emotional status is normal.            Laboratory findings:-    CBC:   Recent Labs      02/01/18   1233   WBC  14.2*   HGB  10.2*   PLT  67*     BMP:    Recent Labs      02/01/18   1138   NA  132*   K  4.5   CL  98   CO2  15*   BUN  18   CREATININE  0.89   GLUCOSE  179*     S. Calcium:  Recent Labs      02/01/18   1138   CALCIUM  8.9     S. Ionized Calcium:No results for input(s): IONCA in the last 72 hours. S. Magnesium:No results for input(s): MG in the last 72 hours. S. Phosphorus:No results for input(s): PHOS in the last 72 hours. S. Glucose:No results for input(s): POCGLU in the last 72 hours. Glycosylated hemoglobin A1C: No results for input(s): LABA1C in the last 72 hours. INR:   Recent Labs      02/01/18   1316   INR  1.5     Hepatic functions:   Recent Labs      02/01/18   1138   ALKPHOS  83   ALT  20   AST  75*   PROT  6.3*   BILITOT  3.77*   LABALBU  2.9*     Pancreatic functions:No results for input(s): LACTA, AMYLASE in the last 72 hours.   S. Lactic Acid: No results for input(s): LACTA in the last 72 hours. Cardiac enzymes:  Recent Labs      02/01/18   1211  02/01/18   1405   TROPONINI  0.01  0.06     BNP:No results for input(s): BNP in the last 72 hours. Lipid profile: No results for input(s): CHOL, TRIG, HDL, LDLCALC in the last 72 hours. Invalid input(s): LDL  Blood Gases: No results found for: PH, PCO2, PO2, HCO3, O2SAT  Thyroid functions:   Lab Results   Component Value Date    TSH 2.35 01/02/2018        Radiological findings:-   CT head 2/1/18      Small parenchymal contusion in the posterior right frontal lobe.  No midline   shift or extra-axial fluid collection. CXR portable : 2/1/18   No acute cardiopulmonary findings.       Displaced proximal left humeral fracture with dislocation of the humeral head   from the glenoid. Ekg reviewed : no acute changes       Assessment and Plan       Assessment:    Active Problems:    Lactic acidosis    Intraparenchymal hemorrhage of brain (HCC)    Recurrent falls    Thrombocytopenia (HCC)    Sepsis (HCC)    Elevated LFTs       Plan:    1. Start IVF @ 100 ml/hr , blood cx x 2 , Urine cx   2. Start rocephin 2 g q 24 , UA positive for UTI, culture   3. Order 2d echo , Ekg non specific changes , nothing acute , draw blood trop x 2 , pt has grade 3 ejection systolic murmur   4. Check LFTs , CMP shows elevated AST and bilrubin but normal ALP , get abdominal USG , to evaluate spleen and liver , GB   5. Thrombocytopenia chronic , check peripheral smear,  TSH with relfex , LFTs , abdominal USG to evaluate spleen and liver , b12 and folate , check hepatitis panel   6. Trend lactic acidosis x 3   7. Check orthostatic vitals , compression stockings if positive   8. Hold dvt prophylaxis due to thrombocytopenia , place SCD   9. NS on board for intraparenchymal contusion , continue to monitor for now         Please note that this chart was generated using voice recognition dictation software.   Although every effort was made to ensure the accuracy of this automated

## 2018-02-01 NOTE — ED NOTES
Bed: 35  Expected date:   Expected time:   Means of arrival:   Comments:  [id number] Tyrone Hendrix RN  02/01/18 1109

## 2018-02-01 NOTE — FLOWSHEET NOTE
[address] [facility] [place] [address]     Emergency/Trauma Note    PATIENT NAME: Claudetta Gasmen    Shift date: 2/1/18  Shift day: Thursday   Shift # 1    Room # ERICK/ERICK   Name: Claudetta Gasmen            Age: 74 y.o. Gender: female          Rastafarian: [address] of Quaker: none    Trauma/Incident type: Fall from Bed (2n fall in 2 days)      Adult Trauma Consult  Admit Date & Time: 2/1/2018 11:01 AM        PATIENT/EVENT DESCRIPTION:  Claudetta Gasmen is a 74 y.o. female who arrived via EMS  from [facility]. Patient received a contusion to the brain and is being held for observation. Rich Moss SPIRITUAL ASSESSMENT/INTERVENTION:  Patient lives alone and has no family. She has a  whom she relates to. Patient was happy to share her life story and had only one concern to get home to her apartment. PATIENT BELONGINGS:  No belongings noted    ANY BELONGINGS OF SIGNIFICANT VALUE NOTED:  None    REGISTRATION STAFF NOTIFIED?         WHAT IS YOUR SPIRITUAL CARE PLAN FOR THIS PATIENT?:  Spiritual Care is ready if the patients need spiritual or emotional support       02/01/18 1420   Encounter Summary   Services provided to: Patient   Referral/Consult From: Multi-disciplinary team   Support System /   Place of Sabianism None  (Anabaptist)   Continue Visiting (2/1/18)   Complexity of Encounter High   Length of Encounter 2 hours   Spiritual Assessment Completed Yes   Crisis   Type Trauma   Assessment Approachable;Peaceful   Intervention Active listening;Explored feelings, thoughts, concerns;Explored coping resources;Prayer;Sustaining presence/ Ministry of presence   Outcome Expressed gratitude       Electronically signed by Chaplain Andree on 2/1/2018 at 3:44 PM.

## 2018-02-01 NOTE — ED PROVIDER NOTES
(1.626 m)   Wt 180 lb (81.6 kg)   SpO2 99%   BMI 30.90 kg/m²     Physical Exam   Constitutional: She is oriented to person, place, and time. She appears well-developed and well-nourished. HENT:   Head: Normocephalic and atraumatic. Eyes: Conjunctivae and EOM are normal.   Neck: Normal range of motion. No tenderness to palpation of the cervical spinous processes. Cardiovascular: Normal rate, regular rhythm and normal heart sounds. Exam reveals no gallop and no friction rub. No murmur heard. Pulmonary/Chest: Effort normal and breath sounds normal. No respiratory distress. She has no wheezes. She has no rales. Abdominal: Soft. She exhibits no distension. There is no tenderness. There is no rebound. Musculoskeletal: Normal range of motion. She exhibits no edema. Sling in place from previous humeral neck fracture on the left. Neurological: She is alert and oriented to person, place, and time. Skin: Skin is warm and dry. No rash noted. No erythema. Psychiatric: She has a normal mood and affect.  Thought content normal.       DIFFERENTIAL  DIAGNOSIS     PLAN (LABS / IMAGING / EKG):  Orders Placed This Encounter   Procedures    Urine Culture    Culture Blood #1    Culture Blood #1    Urine Culture    CT Head WO Contrast    CT CERVICAL SPINE WO CONTRAST    XR CHEST PORTABLE    Comprehensive Metabolic Panel    Urinalysis with microscopic    Lactic Acid, Whole Blood    CBC Auto Differential    APTT    Protime-INR    Lactic Acid, Plasma    Telemetry monitoring    Inpatient consult to Trauma Surgery    Inpatient consult to Neurosurgery    Inpatient consult to Internal Medicine    Pulse oximetry, continuous    POCT Troponin I    POCT troponin    POCT troponin    EKG 12 Lead    Insert peripheral IV    PATIENT STATUS (FROM ED OR OR/PROCEDURAL) Inpatient       MEDICATIONS ORDERED:  Orders Placed This Encounter   Medications    0.9 % sodium chloride bolus    cefTRIAXone (ROCEPHIN) Result Value Ref Range    Lactic Acid, Whole Blood 5.9 (H) 0.7 - 2.1 mmol/L   CBC Auto Differential   Result Value Ref Range    WBC 14.2 (H) 3.5 - 11.3 k/uL    RBC 2.70 (L) 3.95 - 5.11 m/uL    Hemoglobin 10.2 (L) 11.9 - 15.1 g/dL    Hematocrit 28.2 (L) 36.3 - 47.1 %    .4 (H) 82.6 - 102.9 fL    MCH 37.8 (H) 25.2 - 33.5 pg    MCHC 36.2 (H) 28.4 - 34.8 g/dL    RDW 18.6 (H) 11.8 - 14.4 %    Platelets 67 (L) 071 - 453 k/uL    MPV 12.4 8.1 - 13.5 fL    NRBC Automated 0.0 0.0 per 100 WBC    Differential Type NOT REPORTED     Seg Neutrophils 86 (H) 36 - 65 %    Lymphocytes 6 (L) 24 - 43 %    Monocytes 7 3 - 12 %    Eosinophils % 0 (L) 1 - 4 %    Basophils 0 0 - 2 %    Immature Granulocytes 1 (H) 0 %    Segs Absolute 12.19 (H) 1.50 - 8.10 k/uL    Absolute Lymph # 0.84 (L) 1.10 - 3.70 k/uL    Absolute Mono # 1.03 0.10 - 1.20 k/uL    Absolute Eos # <0.03 0.00 - 0.44 k/uL    Basophils # <0.03 0.00 - 0.20 k/uL    Absolute Immature Granulocyte 0.11 0.00 - 0.30 k/uL    WBC Morphology NOT REPORTED     RBC Morphology ANISOCYTOSIS PRESENT     Platelet Estimate NOT REPORTED    APTT   Result Value Ref Range    PTT 31.5 (H) 21.3 - 31.3 sec   Protime-INR   Result Value Ref Range    Protime 16.6 (H) 9.4 - 12.6 sec    INR 1.5    POCT troponin   Result Value Ref Range    POC Troponin I 0.01 0.00 - 0.10 ng/mL    POC Troponin Interp       The Troponin-I (POC) results cannot be compared to the Troponin-T results. POCT troponin   Result Value Ref Range    POC Troponin I 0.06 0.00 - 0.10 ng/mL    POC Troponin Interp       The Troponin-I (POC) results cannot be compared to the Troponin-T results.        RADIOLOGY:  Ct Head Wo Contrast    Result Date: 2/1/2018  EXAMINATION: CT OF THE HEAD WITHOUT CONTRAST  2/1/2018 11:55 am TECHNIQUE: CT of the head was performed without the administration of intravenous contrast. Dose modulation, iterative reconstruction, and/or weight based adjustment of the mA/kV was utilized to reduce the radiation dose to as low as reasonably achievable. COMPARISON: None HISTORY: ORDERING SYSTEM PROVIDED HISTORY: Multiple frequent falls FINDINGS: BRAIN/VENTRICLES: Small parenchymal contusion measuring approximately 7 mm in size in the posterior right parafalcine frontal lobe near the vertex. No extra-axial fluid collection. No significant mass effect or midline shift. Moderate diffuse cerebral and cerebellar volume loss with proportionate prominence of the ventricles and sulci. No hydrocephalus. Gray-white differentiation is preserved. Mild patchy low attenuation in the subcortical, periventricular and deep white matter likely reflect sequela of chronic microvascular ischemia. Patent basal cisterns. Vascular calcifications along the carotid siphons. ORBITS: The visualized portion of the orbits demonstrate no acute abnormality. SINUSES: The visualized paranasal sinuses and mastoid air cells demonstrate no acute abnormality. SOFT TISSUES/SKULL:  No acute abnormality of the visualized skull or soft tissues. Incidental hyperostosis frontalis interna. Small parenchymal contusion in the posterior right frontal lobe. No midline shift or extra-axial fluid collection. Ct Cervical Spine Wo Contrast    Result Date: 2/1/2018  EXAMINATION: CT OF THE CERVICAL SPINE WITHOUT CONTRAST 2/1/2018 11:55 am TECHNIQUE: CT of the cervical spine was performed without the administration of intravenous contrast. Multiplanar reformatted images are provided for review. Dose modulation, iterative reconstruction, and/or weight based adjustment of the mA/kV was utilized to reduce the radiation dose to as low as reasonably achievable. COMPARISON: None. HISTORY: ORDERING SYSTEM PROVIDED HISTORY: Multiple falls FINDINGS: BONES/ALIGNMENT: There is no evidence of an acute cervical spine fracture. There is normal alignment of the cervical spine. DEGENERATIVE CHANGES: No significant degenerative changes.  SOFT TISSUES: There is no prevertebral soft

## 2018-02-01 NOTE — FLOWSHEET NOTE
Visit  Went with Alexi Flores to ED patient was brought in by EMS as a Trauma Consult. Patient fell yesterday and fractured her arm. Patient fell again today and had a Parenchymal contusion. Patient told us her whole life story from the time she was young until the present. Patient was baptized and raised Yazidism but does not practice any more. Patient lives alone but was worried about being in the hospital because she has lived in current apartment for over 20 years and did not want to loose her apartment (rent due today). Patient has a  who relates to her. Assessment  Patient was in good spirits and said she was not in pain. Intervention  Chaplains listened attentively to the Patient.  prayed with patient. Plan  Spiritual Care is ready to provide spiritual and emotional support. 02/01/18 1420   Encounter Summary   Services provided to: Patient   Referral/Consult From: Multi-disciplinary team   Support System /   Place of Yazidism None  (Yazidism)   Continue Visiting (2/1/18)   Complexity of Encounter High   Length of Encounter 2 hours   Spiritual Assessment Completed Yes   Crisis   Type Trauma   Assessment Approachable;Peaceful   Intervention Active listening;Explored feelings, thoughts, concerns;Explored coping resources;Prayer;Sustaining presence/ Ministry of presence   Outcome Expressed gratitude           .

## 2018-02-01 NOTE — PROGRESS NOTES
Short Call Note      This is a 74 y.o. female admitted 2/1/2018 for Intraparenchymal hemorrhage of brain ([facility] [place] [address] [I61.9]. See H&P of admitting resident for more details. 74 y.o female presented to ER with multiple episodes of falls in last few days. She states that she falls while getting up from bed. During her fall evaluation yesterday she was found to have a non-displaced humerus fracture on the left, which is why she has been using a cane. Her CT head today shows an intraparenchymal contusion. She had elevated WBC count of 14.2. Her BP was elevated in /48    She has PMH of DM, HTN, HLD, obesity and depression.      Assessment    Active Problems:    Lactic acidosis    Intraparenchymal hemorrhage of brain (HCC)    Recurrent falls    Thrombocytopenia (HCC)    Sepsis (HCC)    Elevated LFTs        Plan   Multiple falls-  Orthostatic vitals  2 D ECHO  EKG  IV fluids 100 ml/hr Normal saline  PT eval    DM type 2  HBA1c levels  Low dos insulin sliding scale  Glucose checks ACHS    Hypertension  SBP elevated  Will start coreg 3.125 mg BID  Lisinopril 5 mg OD    Anemia  Hb 10.2- baseline around 12  .4  Vitamin B 12 and folate levels    Thrombocytopenia  67   USG whole abdomen  peripheral smear  baseline in 50`s - 80`s    Sepsis  WBC count 14.2  UA positive  Urine culture  Miri Doll MD            Department of Internal Medicine  [facility], [place]         2/1/2018, 4:44 PM

## 2018-02-02 LAB
ABSOLUTE EOS #: 0.16 K/UL (ref 0–0.44)
ABSOLUTE EOS #: 0.2 K/UL (ref 0–0.44)
ABSOLUTE IMMATURE GRANULOCYTE: 0.05 K/UL (ref 0–0.3)
ABSOLUTE IMMATURE GRANULOCYTE: 0.07 K/UL (ref 0–0.3)
ABSOLUTE LYMPH #: 1.75 K/UL (ref 1.1–3.7)
ABSOLUTE LYMPH #: 1.83 K/UL (ref 1.1–3.7)
ABSOLUTE MONO #: 0.84 K/UL (ref 0.1–1.2)
ABSOLUTE MONO #: 0.93 K/UL (ref 0.1–1.2)
ABSOLUTE RETIC #: 0.09 M/UL (ref 0.03–0.08)
AFP: 11.2 UG/L
ALBUMIN SERPL-MCNC: 2.2 G/DL (ref 3.5–5.2)
ALBUMIN/GLOBULIN RATIO: 0.7 (ref 1–2.5)
ALP BLD-CCNC: 71 U/L (ref 35–104)
ALT SERPL-CCNC: 20 U/L (ref 5–33)
ANION GAP SERPL CALCULATED.3IONS-SCNC: 11 MMOL/L (ref 9–17)
AST SERPL-CCNC: 81 U/L
BASOPHILS # BLD: 0 % (ref 0–2)
BASOPHILS # BLD: 0 % (ref 0–2)
BASOPHILS ABSOLUTE: 0.03 K/UL (ref 0–0.2)
BASOPHILS ABSOLUTE: 0.04 K/UL (ref 0–0.2)
BILIRUB SERPL-MCNC: 2.76 MG/DL (ref 0.3–1.2)
BILIRUBIN DIRECT: 1 MG/DL
BILIRUBIN, INDIRECT: 1.76 MG/DL (ref 0–1)
BUN BLDV-MCNC: 20 MG/DL (ref 8–23)
BUN/CREAT BLD: ABNORMAL (ref 9–20)
CALCIUM SERPL-MCNC: 8.5 MG/DL (ref 8.6–10.4)
CHLORIDE BLD-SCNC: 106 MMOL/L (ref 98–107)
CO2: 22 MMOL/L (ref 20–31)
CREAT SERPL-MCNC: 0.83 MG/DL (ref 0.5–0.9)
CULTURE: NORMAL
DIFFERENTIAL TYPE: ABNORMAL
DIFFERENTIAL TYPE: ABNORMAL
EOSINOPHILS RELATIVE PERCENT: 1 % (ref 1–4)
EOSINOPHILS RELATIVE PERCENT: 2 % (ref 1–4)
FREE KAPPA/LAMBDA RATIO: 1.91 (ref 0.26–1.65)
GFR AFRICAN AMERICAN: >60 ML/MIN
GFR NON-AFRICAN AMERICAN: >60 ML/MIN
GFR SERPL CREATININE-BSD FRML MDRD: ABNORMAL ML/MIN/{1.73_M2}
GFR SERPL CREATININE-BSD FRML MDRD: ABNORMAL ML/MIN/{1.73_M2}
GLOBULIN: ABNORMAL G/DL (ref 1.5–3.8)
GLUCOSE BLD-MCNC: 106 MG/DL (ref 70–99)
HAV IGM SER IA-ACNC: NONREACTIVE
HCT VFR BLD CALC: 23.3 % (ref 36.3–47.1)
HCT VFR BLD CALC: 24.7 % (ref 36.3–47.1)
HEMOGLOBIN: 8 G/DL (ref 11.9–15.1)
HEMOGLOBIN: 8.7 G/DL (ref 11.9–15.1)
HEPATITIS B CORE IGM ANTIBODY: NONREACTIVE
HEPATITIS B SURFACE ANTIGEN: NONREACTIVE
HEPATITIS C ANTIBODY: NONREACTIVE
HIV AG/AB: NONREACTIVE
IMMATURE GRANULOCYTES: 0 %
IMMATURE GRANULOCYTES: 1 %
IMMATURE RETIC FRACT: 29.9 % (ref 2.7–18.3)
KAPPA FREE LIGHT CHAINS QNT: 4.87 MG/DL (ref 0.37–1.94)
LACTIC ACID, WHOLE BLOOD: 2.4 MMOL/L (ref 0.7–2.1)
LACTIC ACID: ABNORMAL MMOL/L
LAMBDA FREE LIGHT CHAINS QNT: 2.55 MG/DL (ref 0.57–2.63)
LV EF: 65 %
LVEF MODALITY: NORMAL
LYMPHOCYTES # BLD: 15 % (ref 24–43)
LYMPHOCYTES # BLD: 16 % (ref 24–43)
Lab: NORMAL
Lab: NORMAL
MCH RBC QN AUTO: 37.2 PG (ref 25.2–33.5)
MCH RBC QN AUTO: 38.2 PG (ref 25.2–33.5)
MCHC RBC AUTO-ENTMCNC: 34.3 G/DL (ref 28.4–34.8)
MCHC RBC AUTO-ENTMCNC: 35.2 G/DL (ref 28.4–34.8)
MCV RBC AUTO: 108.3 FL (ref 82.6–102.9)
MCV RBC AUTO: 108.4 FL (ref 82.6–102.9)
MONOCYTES # BLD: 7 % (ref 3–12)
MONOCYTES # BLD: 8 % (ref 3–12)
NRBC AUTOMATED: 0 PER 100 WBC
NRBC AUTOMATED: 0 PER 100 WBC
PDW BLD-RTO: 18.5 % (ref 11.8–14.4)
PDW BLD-RTO: 18.7 % (ref 11.8–14.4)
PLATELET # BLD: ABNORMAL K/UL (ref 138–453)
PLATELET # BLD: ABNORMAL K/UL (ref 138–453)
PLATELET ESTIMATE: ABNORMAL
PLATELET ESTIMATE: ABNORMAL
PLATELET, FLUORESCENCE: 41 K/UL (ref 138–453)
PLATELET, FLUORESCENCE: 42 K/UL (ref 138–453)
PLATELET, IMMATURE FRACTION: 4.4 % (ref 1.1–10.3)
PLATELET, IMMATURE FRACTION: 4.5 % (ref 1.1–10.3)
PMV BLD AUTO: ABNORMAL FL (ref 8.1–13.5)
PMV BLD AUTO: ABNORMAL FL (ref 8.1–13.5)
POTASSIUM SERPL-SCNC: 4.6 MMOL/L (ref 3.7–5.3)
RBC # BLD: 2.15 M/UL (ref 3.95–5.11)
RBC # BLD: 2.28 M/UL (ref 3.95–5.11)
RBC # BLD: ABNORMAL 10*6/UL
RBC # BLD: ABNORMAL 10*6/UL
RETIC %: 3.9 % (ref 0.5–1.9)
RETIC HEMOGLOBIN: 45.6 PG (ref 28.2–35.7)
SEG NEUTROPHILS: 74 % (ref 36–65)
SEG NEUTROPHILS: 76 % (ref 36–65)
SEGMENTED NEUTROPHILS ABSOLUTE COUNT: 8.56 K/UL (ref 1.5–8.1)
SEGMENTED NEUTROPHILS ABSOLUTE COUNT: 8.9 K/UL (ref 1.5–8.1)
SODIUM BLD-SCNC: 139 MMOL/L (ref 135–144)
SPECIMEN DESCRIPTION: NORMAL
SPECIMEN DESCRIPTION: NORMAL
STATUS: NORMAL
STATUS: NORMAL
TOTAL PROTEIN: 5.3 G/DL (ref 6.4–8.3)
WBC # BLD: 11.6 K/UL (ref 3.5–11.3)
WBC # BLD: 11.8 K/UL (ref 3.5–11.3)
WBC # BLD: ABNORMAL 10*3/UL
WBC # BLD: ABNORMAL 10*3/UL

## 2018-02-02 PROCEDURE — 6360000002 HC RX W HCPCS: Performed by: STUDENT IN AN ORGANIZED HEALTH CARE EDUCATION/TRAINING PROGRAM

## 2018-02-02 PROCEDURE — 36415 COLL VENOUS BLD VENIPUNCTURE: CPT

## 2018-02-02 PROCEDURE — G8987 SELF CARE CURRENT STATUS: HCPCS

## 2018-02-02 PROCEDURE — 97535 SELF CARE MNGMENT TRAINING: CPT

## 2018-02-02 PROCEDURE — 85045 AUTOMATED RETICULOCYTE COUNT: CPT

## 2018-02-02 PROCEDURE — 6370000000 HC RX 637 (ALT 250 FOR IP): Performed by: INTERNAL MEDICINE

## 2018-02-02 PROCEDURE — 94762 N-INVAS EAR/PLS OXIMTRY CONT: CPT

## 2018-02-02 PROCEDURE — 2580000003 HC RX 258: Performed by: STUDENT IN AN ORGANIZED HEALTH CARE EDUCATION/TRAINING PROGRAM

## 2018-02-02 PROCEDURE — 82105 ALPHA-FETOPROTEIN SERUM: CPT

## 2018-02-02 PROCEDURE — 99223 1ST HOSP IP/OBS HIGH 75: CPT | Performed by: INTERNAL MEDICINE

## 2018-02-02 PROCEDURE — 83883 ASSAY NEPHELOMETRY NOT SPEC: CPT

## 2018-02-02 PROCEDURE — 86334 IMMUNOFIX E-PHORESIS SERUM: CPT

## 2018-02-02 PROCEDURE — 84165 PROTEIN E-PHORESIS SERUM: CPT

## 2018-02-02 PROCEDURE — 93306 TTE W/DOPPLER COMPLETE: CPT

## 2018-02-02 PROCEDURE — G8988 SELF CARE GOAL STATUS: HCPCS

## 2018-02-02 PROCEDURE — 97166 OT EVAL MOD COMPLEX 45 MIN: CPT

## 2018-02-02 PROCEDURE — 80048 BASIC METABOLIC PNL TOTAL CA: CPT

## 2018-02-02 PROCEDURE — 2060000000 HC ICU INTERMEDIATE R&B

## 2018-02-02 PROCEDURE — G8979 MOBILITY GOAL STATUS: HCPCS

## 2018-02-02 PROCEDURE — G8978 MOBILITY CURRENT STATUS: HCPCS

## 2018-02-02 PROCEDURE — 99222 1ST HOSP IP/OBS MODERATE 55: CPT | Performed by: INTERNAL MEDICINE

## 2018-02-02 PROCEDURE — 6370000000 HC RX 637 (ALT 250 FOR IP): Performed by: STUDENT IN AN ORGANIZED HEALTH CARE EDUCATION/TRAINING PROGRAM

## 2018-02-02 PROCEDURE — 2500000003 HC RX 250 WO HCPCS: Performed by: STUDENT IN AN ORGANIZED HEALTH CARE EDUCATION/TRAINING PROGRAM

## 2018-02-02 PROCEDURE — 85055 RETICULATED PLATELET ASSAY: CPT

## 2018-02-02 PROCEDURE — 99233 SBSQ HOSP IP/OBS HIGH 50: CPT | Performed by: INTERNAL MEDICINE

## 2018-02-02 PROCEDURE — 87389 HIV-1 AG W/HIV-1&-2 AB AG IA: CPT

## 2018-02-02 PROCEDURE — 97162 PT EVAL MOD COMPLEX 30 MIN: CPT

## 2018-02-02 PROCEDURE — 83605 ASSAY OF LACTIC ACID: CPT

## 2018-02-02 PROCEDURE — 80076 HEPATIC FUNCTION PANEL: CPT

## 2018-02-02 PROCEDURE — 85025 COMPLETE CBC W/AUTO DIFF WBC: CPT

## 2018-02-02 PROCEDURE — 84155 ASSAY OF PROTEIN SERUM: CPT

## 2018-02-02 RX ORDER — MULTIVITAMIN WITH FOLIC ACID 400 MCG
1 TABLET ORAL DAILY
Status: DISCONTINUED | OUTPATIENT
Start: 2018-02-02 | End: 2018-02-06 | Stop reason: HOSPADM

## 2018-02-02 RX ADMIN — ACETAMINOPHEN 650 MG: 325 TABLET ORAL at 00:18

## 2018-02-02 RX ADMIN — ACETAMINOPHEN 650 MG: 325 TABLET ORAL at 22:51

## 2018-02-02 RX ADMIN — CEFTRIAXONE SODIUM 2 G: 2 INJECTION, POWDER, FOR SOLUTION INTRAMUSCULAR; INTRAVENOUS at 05:54

## 2018-02-02 RX ADMIN — FOLIC ACID: 5 INJECTION, SOLUTION INTRAMUSCULAR; INTRAVENOUS; SUBCUTANEOUS at 14:41

## 2018-02-02 RX ADMIN — THERA TABS 1 TABLET: TAB at 14:41

## 2018-02-02 RX ADMIN — FOLIC ACID 1 MG: 1 TABLET ORAL at 08:38

## 2018-02-02 ASSESSMENT — PAIN DESCRIPTION - PROGRESSION
CLINICAL_PROGRESSION: NOT CHANGED
CLINICAL_PROGRESSION: GRADUALLY IMPROVING

## 2018-02-02 ASSESSMENT — PAIN DESCRIPTION - LOCATION
LOCATION: ARM

## 2018-02-02 ASSESSMENT — PAIN DESCRIPTION - DESCRIPTORS
DESCRIPTORS: ACHING

## 2018-02-02 ASSESSMENT — PAIN DESCRIPTION - ORIENTATION
ORIENTATION: LEFT

## 2018-02-02 ASSESSMENT — PAIN SCALES - GENERAL
PAINLEVEL_OUTOF10: 4
PAINLEVEL_OUTOF10: 4
PAINLEVEL_OUTOF10: 7
PAINLEVEL_OUTOF10: 0
PAINLEVEL_OUTOF10: 3
PAINLEVEL_OUTOF10: 5

## 2018-02-02 ASSESSMENT — PAIN DESCRIPTION - ONSET
ONSET: ON-GOING
ONSET: ON-GOING

## 2018-02-02 ASSESSMENT — PAIN DESCRIPTION - PAIN TYPE
TYPE: ACUTE PAIN

## 2018-02-02 ASSESSMENT — PAIN DESCRIPTION - FREQUENCY
FREQUENCY: INTERMITTENT
FREQUENCY: INTERMITTENT

## 2018-02-02 NOTE — PROGRESS NOTES
Smoking Cessation - topics covered   []  Health Risks  []  Benefits of Quitting   []  Smoking Cessation  []  Patient has no history of tobacco use  [x]  Patient is former smoker. [x]  No need for tobacco cessation education. []  Booklet given  []  Patient verbalizes understanding. []  Patient denies need for tobacco cessation education.   Morris Del Rio  8:43 AM

## 2018-02-02 NOTE — CONSULTS
Pain for up to 7 days. 1/31/18 2/7/18  Toby Robles MD   ibuprofen (ADVIL;MOTRIN) 800 MG tablet Take 1 tablet by mouth every 8 hours as needed for Pain 1/31/18   Toby Robles MD   folic acid (FOLVITE) 1 MG tablet Take 1 tablet by mouth daily 1/3/18   Shilpi Figueroa MD   Cholecalciferol 2000 units TABS Once daily 1/3/18   Shilpi Figueroa MD   sertraline (ZOLOFT) 100 MG tablet Take 100 mg by mouth 2 times daily. Historical Provider, MD   traZODone (DESYREL) 50 MG tablet Take 50 mg by mouth as needed for Sleep. Historical Provider, MD   acetaminophen (TYLENOL) 500 MG tablet Take 500 mg by mouth every 4 hours as needed for Pain. Historical Provider, MD     Current Facility-Administered Medications   Medication Dose Route Frequency Provider Last Rate Last Dose    folic acid 1 mg, thiamine (B-1) 100 mg in dextrose 5 % 500 mL IVPB   Intravenous Daily Jaiden Cuellar  mL/hr at 02/02/18 1441      multivitamin 1 tablet  1 tablet Oral Daily Jaiden Cuellar MD   1 tablet at 02/02/18 1441    sodium chloride flush 0.9 % injection 10 mL  10 mL Intravenous 2 times per day Juhi Perry MD   10 mL at 02/01/18 2016    sodium chloride flush 0.9 % injection 10 mL  10 mL Intravenous PRN Juhi Perry MD        acetaminophen (TYLENOL) tablet 650 mg  650 mg Oral Q4H PRN Juhi Perry MD   650 mg at 02/02/18 0018    magnesium hydroxide (MILK OF MAGNESIA) 400 MG/5ML suspension 30 mL  30 mL Oral Daily PRN Mel Pedroza MD        ondansetron [facility]) injection 4 mg  4 mg Intravenous Q6H PRN Mel Pedroza MD        cefTRIAXone (ROCEPHIN) 2 g in sterile water 20 mL IV syringe  2 g Intravenous Q24H Mel Pedroza MD   2 g at 02/02/18 0554    0.9 % sodium chloride bolus  30 mL/kg Intravenous Once Carolann Marks MD           Allergies:  Review of patient's allergies indicates no known allergies. Social History:   reports that she has quit smoking. She has a 50.00 pack-year smoking history.  She has never contusion in the posterior right frontal lobe. No midline shift or extra-axial fluid collection. Critical results were called by Dr. Dorian Holden to [address] [place] [address] on 02/01/2018 at 1245 hours     Ct Cervical Spine Wo Contrast    Result Date: 2/1/2018  EXAMINATION: CT OF THE CERVICAL SPINE WITHOUT CONTRAST 2/1/2018 11:55 am TECHNIQUE: CT of the cervical spine was performed without the administration of intravenous contrast. Multiplanar reformatted images are provided for review. Dose modulation, iterative reconstruction, and/or weight based adjustment of the mA/kV was utilized to reduce the radiation dose to as low as reasonably achievable. COMPARISON: None. HISTORY: ORDERING SYSTEM PROVIDED HISTORY: Multiple falls FINDINGS: BONES/ALIGNMENT: There is no evidence of an acute cervical spine fracture. There is normal alignment of the cervical spine. DEGENERATIVE CHANGES: No significant degenerative changes. SOFT TISSUES: There is no prevertebral soft tissue swelling. No acute abnormality of the cervical spine. Xr Shoulder Left (min 2 Views)    Result Date: 1/31/2018  EXAMINATION: 3 VIEWS OF THE LEFT SHOULDER 1/31/2018 6:25 pm COMPARISON: None. HISTORY: ORDERING SYSTEM PROVIDED HISTORY: proximal humeral fracture TECHNOLOGIST PROVIDED HISTORY: Reason for exam:->proximal humeral fracture FINDINGS: Impacted comminuted fracture of the humeral neck. Alignment is anatomic. Visualized chest negative. Fracture left humeral neck     Xr Chest Portable    Result Date: 2/1/2018  EXAMINATION: SINGLE VIEW OF THE CHEST 2/1/2018 1:57 pm COMPARISON: None. HISTORY: ORDERING SYSTEM PROVIDED HISTORY: ASM, sepsis, multiple falls TECHNOLOGIST PROVIDED HISTORY: Reason for exam:->ASM, sepsis, multiple falls FINDINGS: Normal lung volumes. No pneumothorax, pleural effusion or airspace consolidation. Normal heart size and mediastinal contours. Normal pulmonary vascular markings.   Displaced fracture through the proximal left humeral metadiaphysis. Left humeral head is dislocated from the glenoid. Chronic deformity of the right humerus with intact plate and screw fixation. No acute cardiopulmonary findings. Displaced proximal left humeral fracture with dislocation of the humeral head from the glenoid. Us Abdomen Limited    Result Date: 2/1/2018  EXAMINATION: RIGHT UPPER QUADRANT ULTRASOUND 2/1/2018 4:18 pm COMPARISON: None. HISTORY: ORDERING SYSTEM PROVIDED HISTORY: ABNORMAL LIVER FUNCTION TESTS TECHNOLOGIST PROVIDED HISTORY: Reason for exam:->Abnormal liver function tests FINDINGS: LIVER:  Heterogenous echotexture of the liver with a focal hypoechoic area within the right hepatic lobe measuring up to 3 cm. BILIARY SYSTEM:  Cholelithiasis. Gallbladder wall measures up to 2.5 mm. There is no pericholecystic fluid, and the sonographic Vic Blow sign is negative. Common bile duct is within normal limits measuring 2.5 mm. OTHER: Spleen is not well visualized measuring grossly 7.4 x 6.5 x 2.5 cm.     1. Limited evaluation. Patient was not able to move well, precluding proper positioning. 2. There is an indeterminate 3 cm lesion related to the right hepatic lobe. Portal venous flow is not confidently evaluated. 3. Cholelithiasis 4. Suboptimal evaluation of the spleen shows no focal abnormality.        IMPRESSION:   Primary Problem  <principal problem not specified>    Active Hospital Problems    Diagnosis Date Noted    Lactic acidosis [E87.2] 02/01/2018    Intraparenchymal hemorrhage of brain ([address] [I61.9] 02/01/2018    Recurrent falls [R29.6] 02/01/2018    Thrombocytopenia ([address] [D69.6] 02/01/2018    Sepsis due to urinary tract infection ([address] [A41.9, N39.0] 02/01/2018    Elevated LFTs [R79.89] 02/01/2018     Chronic thrombocytopenia  Macrocytic anemia  Abnormal LFTs with elevated unconjugated bilirubin  Indeterminate liver lesion        RECOMMENDATIONS:  I had a detailed discussion with the patient and personally went over the results of her blood workup imaging studies and previous medical records. Patient has long-standing history of thrombocytopenia. Ultrasound upper abdomen does not reveal splenomegaly. Patient HCV antibodies were negative. We will also check for HIV antibody screening. On the peripheral smear. Differential diagnosis of macrocytic anemia includes folic acid deficiency, hemolysis, paraproteinemia. TSH was within normal range. Primary bone marrow disorder are also in differential.  Doubt hemolysis however if hemoglobin continues to drop with worsening of unconjugated bilirubin will proceed with workup for hemolytic anemia. Macrocytic anemia and thrombocytopenia can be explained by folic acid deficiency. Patient started on supplements by internal medicine team.  We will also check for iron levels to rule out concomitant deficiency. Follow-up on CT liver for further evaluation of liver lesion      Discussed with patient and Nurse. Thank you for asking us to see this patient. Aaron Lopez MD          This note is created with the assistance of a speech recognition program.  While intending to generate a document that actually reflects the content of the visit, the document can still have some errors including those of syntax and sound a like substitutions which may escape proof reading. It such instances, actual meaning can be extrapolated by contextual diversion.

## 2018-02-02 NOTE — PROGRESS NOTES
home.) Acuity: Acute Type of Exam: Unknown FINDINGS: There is a comminuted foreshortened fracture through the surgical neck of the left humerus. There are hypertrophic degenerative changes of the acromioclavicular joint No joint effusion identified. Joint spaces appear maintained. Visualized osseous structures are moderately demineralized. Soft tissues have a normal radiographic appearance. 1. Impacted comminuted fracture through the surgical neck of the left humerus. 2. No radiographic evidence for acute osseous abnormality of the left elbow. Xr Elbow Left (min 3 Views)    Result Date: 1/31/2018  EXAMINATION: AP AND LATERAL VIEWS OF THE LEFT HUMERUS; 3 VIEWS OF THE LEFT ELBOW 1/31/2018 5:16 pm COMPARISON: None. HISTORY: ORDERING SYSTEM PROVIDED HISTORY: left arm pain just proximal to the elbow TECHNOLOGIST PROVIDED HISTORY: Reason for exam:->left arm pain just proximal to the elbow Ordering Physician Provided Reason for Exam: Arm Injury (pt states she almost fell but lowered herself to ground with controlled movements, pt states she wasn't able to get up until someone helped her up and was back to home.) Acuity: Acute Type of Exam: Unknown FINDINGS: There is a comminuted foreshortened fracture through the surgical neck of the left humerus. There are hypertrophic degenerative changes of the acromioclavicular joint No joint effusion identified. Joint spaces appear maintained. Visualized osseous structures are moderately demineralized. Soft tissues have a normal radiographic appearance. 1. Impacted comminuted fracture through the surgical neck of the left humerus. 2. No radiographic evidence for acute osseous abnormality of the left elbow.      Ct Head Wo Contrast    Result Date: 2/1/2018  EXAMINATION: CT OF THE HEAD WITHOUT CONTRAST  2/1/2018 11:55 am TECHNIQUE: CT of the head was performed without the administration of intravenous contrast. Dose modulation, iterative reconstruction, and/or weight significant degenerative changes. SOFT TISSUES: There is no prevertebral soft tissue swelling. No acute abnormality of the cervical spine. Xr Shoulder Left (min 2 Views)    Result Date: 1/31/2018  EXAMINATION: 3 VIEWS OF THE LEFT SHOULDER 1/31/2018 6:25 pm COMPARISON: None. HISTORY: ORDERING SYSTEM PROVIDED HISTORY: proximal humeral fracture TECHNOLOGIST PROVIDED HISTORY: Reason for exam:->proximal humeral fracture FINDINGS: Impacted comminuted fracture of the humeral neck. Alignment is anatomic. Visualized chest negative. Fracture left humeral neck     Xr Chest Portable    Result Date: 2/1/2018  EXAMINATION: SINGLE VIEW OF THE CHEST 2/1/2018 1:57 pm COMPARISON: None. HISTORY: ORDERING SYSTEM PROVIDED HISTORY: ASM, sepsis, multiple falls TECHNOLOGIST PROVIDED HISTORY: Reason for exam:->ASM, sepsis, multiple falls FINDINGS: Normal lung volumes. No pneumothorax, pleural effusion or airspace consolidation. Normal heart size and mediastinal contours. Normal pulmonary vascular markings. Displaced fracture through the proximal left humeral metadiaphysis. Left humeral head is dislocated from the glenoid. Chronic deformity of the right humerus with intact plate and screw fixation. No acute cardiopulmonary findings. Displaced proximal left humeral fracture with dislocation of the humeral head from the glenoid. Us Abdomen Limited    Result Date: 2/1/2018  EXAMINATION: RIGHT UPPER QUADRANT ULTRASOUND 2/1/2018 4:18 pm COMPARISON: None. HISTORY: ORDERING SYSTEM PROVIDED HISTORY: ABNORMAL LIVER FUNCTION TESTS TECHNOLOGIST PROVIDED HISTORY: Reason for exam:->Abnormal liver function tests FINDINGS: LIVER:  Heterogenous echotexture of the liver with a focal hypoechoic area within the right hepatic lobe measuring up to 3 cm. BILIARY SYSTEM:  Cholelithiasis. Gallbladder wall measures up to 2.5 mm. There is no pericholecystic fluid, and the sonographic Birrwil sign is negative.  Common bile duct is within diaphoretic. Spine:     Spine Tenderness ROM   Cervical 0 /10 Normal   Thoracic 0 /10 Normal   Lumbar 0 /10 Normal     Musculoskeletal    Joint Tenderness Swelling ROM   Right shoulder absent absent normal   Left shoulder absent absent normal   Right elbow absent absent normal   Left elbow present In sling limited   Right wrist absent absent normal   Left wrist absent absent normal   Right hand grasp absent absent normal   Left hand grasp absent absent normal   Right hip absent absent normal   Left hip absent absent normal   Right knee absent absent normal   Left knee absent absent normal   Right ankle absent absent normal   Left ankle absent absent normal   Right foot absent absent normal   Left foot absent absent normal       Consults:  IP CONSULT TO TRAUMA SURGERY  IP CONSULT TO NEUROSURGERY  IP CONSULT TO INTERNAL MEDICINE  IP CONSULT TO SOCIAL WORK  IP CONSULT TO NEUROSURGERY    Procedures:      Injuries:    Nondisplaced fracture of left humerus, multiple falls. Patient Active Problem List   Diagnosis    DM (diabetes mellitus) ([facility] [address]    HLD (hyperlipidemia)    HTN (hypertension)    Obesity    Depression    Lactic acidosis    Intraparenchymal hemorrhage of brain (HCC)    Recurrent falls    Thrombocytopenia ([facility] [address]    Sepsis due to urinary tract infection (HCC)    Elevated LFTs         Assessment/Plan:     Medical management per Primary. Neurosurgery consulted by primary. No further imaging or management by Trauma necessary. Trauma to sign off at this time. Please re-consult as necessary.      PROPHYLAXIS:   Stress ulcer: None   VTE: SCDs    DISPOSITION:   Per primary team      Giovanni Brumfield DO  Emergency Medicine Resident  Trauma/Surgery Service  2/1/2018 at 10:39 PM

## 2018-02-02 NOTE — CONSULTS
nausea, vomiting, diarrhea, constipation, abdominal pain   GENITOURINARY: negative for incontinence   MUSCULOSKELETAL: negative for neck or back pain   NEUROLOGICAL: negative for headaches, dizziness, weakness, numbness and tingling   PSYCHIATRIC: negative for agitated, +poor historian     Review of systems otherwise negative.     PHYSICAL EXAM:       BP (!) 163/47   Pulse 94   Temp 97.9 °F (36.6 °C) (Oral)   Resp 15   Ht 5' 4\" (1.626 m)   Wt 180 lb (81.6 kg)   SpO2 97%   BMI 30.90 kg/m²       CONSTITUTIONAL: no apparent distress, appears stated age, unkempt appearance   HEAD: normocephalic, atraumatic   ENT: dry mucous membranes, poor dentition, halitosis   NECK: supple, symmetric, no midline tenderness to palpation   BACK: without midline tenderness, step-offs or deformities   LUNGS: clear to auscultation bilaterally   CARDIOVASCULAR: regular rate and rhythm   ABDOMEN: Soft, non-tender, non-distended with normal active bowel sounds   NEUROLOGIC:  EYE OPENING     Spontaneous - 4 [x]       To voice - 3 []       To pain - 2 []       None - 1 []    VERBAL RESPONSE     Appropriate, oriented - 5 [x]       Dazed or confused - 4 []       Syllables, expletives - 3 []       Grunts - 2 []       None - 1 []    MOTOR RESPONSE     Spontaneous, command - 6 [x]       Localizes pain - 5 []       Withdraws pain - 4 []       Abnormal flexion - 3 []       Abnormal extension - 2 []       None - 1 []            Total GCS:  15    Mental Status:  A & O x3, flight of ideas, needed redirection    Awake             Cranial Nerves:    cranial nerves II-XII are grossly intact    Motor Exam:    Drift:  absent  Tone:  normal    Motor exam is symmetrical 5 out of 5 all extremities bilaterally, left HG 4/5 remainder exam deferred 2/2 humeral fx in sling    Sensory:    Touch:    Right Upper Extremity:  normal  Left Upper Extremity:  normal  Right Lower Extremity:  normal  Left Lower Extremity:  normal    Deep Tendon Reflexes:    Right Bicep: contrast. Dose modulation, iterative reconstruction, and/or weight based adjustment of the mA/kV was utilized to reduce the radiation dose to as low as reasonably achievable. COMPARISON: None HISTORY: ORDERING SYSTEM PROVIDED HISTORY: Multiple frequent falls FINDINGS: BRAIN/VENTRICLES: Small parenchymal contusion measuring approximately 7 mm in size in the posterior right parafalcine frontal lobe near the vertex. No extra-axial fluid collection. No significant mass effect or midline shift. Moderate diffuse cerebral and cerebellar volume loss with proportionate prominence of the ventricles and sulci. No hydrocephalus. Gray-white differentiation is preserved. Mild patchy low attenuation in the subcortical, periventricular and deep white matter likely reflect sequela of chronic microvascular ischemia. Patent basal cisterns. Vascular calcifications along the carotid siphons. ORBITS: The visualized portion of the orbits demonstrate no acute abnormality. SINUSES: The visualized paranasal sinuses and mastoid air cells demonstrate no acute abnormality. SOFT TISSUES/SKULL:  No acute abnormality of the visualized skull or soft tissues. Incidental hyperostosis frontalis interna. Small parenchymal contusion in the posterior right frontal lobe. No midline shift or extra-axial fluid collection. Ct Cervical Spine Wo Contrast    Result Date: 2/1/2018  EXAMINATION: CT OF THE CERVICAL SPINE WITHOUT CONTRAST 2/1/2018 11:55 am TECHNIQUE: CT of the cervical spine was performed without the administration of intravenous contrast. Multiplanar reformatted images are provided for review. Dose modulation, iterative reconstruction, and/or weight based adjustment of the mA/kV was utilized to reduce the radiation dose to as low as reasonably achievable. COMPARISON: None. HISTORY: ORDERING SYSTEM PROVIDED HISTORY: Multiple falls FINDINGS: BONES/ALIGNMENT: There is no evidence of an acute cervical spine fracture.  There is normal

## 2018-02-02 NOTE — PROGRESS NOTES
Required lying rest break. Sat up for another 10 minutes.)  Standing Balance: Minimal assistance    Standing Balance  Time: ~5 seconds  Activity: Functional transfers/mobility EOB. Sit to stand: Minimal assistance  Stand to sit: Minimal assistance    ADL  Feeding: Setup  Grooming: Contact guard assistance  UE Bathing: Minimal assistance; Increased time to complete  LE Bathing: Maximum assistance; Increased time to complete  UE Dressing: Minimal assistance; Increased time to complete  LE Dressing: Maximum assistance; Increased time to complete  Toileting: Moderate assistance; Increased time to complete     Tone RUE  RUE Tone: Normotonic  Tone LUE  LUE Tone: Normotonic  Coordination  Movements Are Fluid And Coordinated: Yes     Bed mobility  Supine to Sit: Moderate assistance  Sit to Supine: Moderate assistance  Scooting: Minimal assistance     Transfers  Sit to stand: Minimal assistance  Stand to sit: Minimal assistance     Cognition  Overall Cognitive Status: [facility]   Comments: Throughout 53 minute evaluation, S/OT provided mod redirections to tasks. Sensation  Overall Sensation Status: [facility]      LUE General AROM: Unable to access d/t humerous fx and NWB status. Left Hand AROM: [facility]  Left Hand General AROM: L elbow, wrist and digits. RUE AROM : Exceptions  RUE General AROM: R shoulder flexion 0-70 degree  Right Hand AROM: [facility]     LUE Strength  Gross LUE Strength: Exceptions to [facility]  L Hand Grasp: NT  LUE Strength Comment: NT d/t NWB status in LUE   RUE Strength  Gross RUE Strength: [facility] (3/5 R shoulder strength, 4/5 R elbow strength  )  R Hand Grasp: 4/5    Assessment   Performance deficits / Impairments: Decreased functional mobility ; Decreased ADL status; Decreased ROM; Decreased safe awareness;Decreased endurance;Decreased balance  Comments: Pt's participation in ADL routines and functional transfers/mobility is inhibited by above noted performance deficits.  Pt would benefit from continued acute/post acute care OT

## 2018-02-02 NOTE — PROGRESS NOTES
shoulder surgery (Right).     Restrictions  Restrictions/Precautions  Restrictions/Precautions: Weight Bearing, Fall Risk  Required Braces or Orthoses?: Yes (sling LUE)  Upper Extremity Weight Bearing Restrictions  Left Upper Extremity Weight Bearing: Non Weight Bearing  Vision/Hearing  Vision: Within Functional Limits  Hearing: Within functional limits     Subjective  General  Patient assessed for rehabilitation services?: Yes  Response To Previous Treatment: Not applicable  Family / Caregiver Present: No  Follows Commands: Within Functional Limits  Pain Screening  Patient Currently in Pain: Yes  Pain Assessment  Pain Assessment: 0-10  Pain Level: 4  Pain Type: Acute pain  Pain Location: Arm  Pain Orientation: Left  Pain Descriptors: Aching  Pain Frequency: Intermittent (when moving)  Pain Onset: On-going  Clinical Progression: Not changed  Vital Signs  Patient Currently in Pain: Yes  Pre Treatment Pain Screening  Intervention List: Patient able to continue with treatment    Orientation  Orientation  Overall Orientation Status: Within Functional Limits    Social/Functional History  Social/Functional History  Lives With: Alone  Type of Home: Apartment  Home Layout: One level  Home Access: Stairs to enter with rails  Entrance Stairs - Number of Steps: 8; 1 HR per pt  Home Equipment: Quad cane  Receives Help From:  (pt states she has no one to help her)  ADL Assistance: Independent  Homemaking Assistance: Independent  Homemaking Responsibilities: Yes  Ambulation Assistance: Independent  Transfer Assistance: Independent  Active : No  Mode of Transportation: Cab  Objective          AROM RLE (degrees)  RLE AROM: WFL  AROM LLE (degrees)  LLE AROM : WFL  AROM RUE (degrees)  RUE AROM : Exceptions--shoulder elevation to ~90 degrees; elbow distal WFL  AROM LUE (degrees)  LUE AROM : Exceptions--shoulder/elbow not assessed s/p humeral fracture; wrist/fingers WFL  Strength RLE  Strength RLE: WFL  Strength LLE  Strength LLE:

## 2018-02-02 NOTE — PROGRESS NOTES
oriented to person, place, and time. Skin: Skin is warm. Labs:-    CBC:   Recent Labs      02/01/18   1233  02/02/18   0749   WBC  14.2*  11.6*   HGB  10.2*  8.0*   PLT  67*  See Reflexed IPF Result     BMP:  Recent Labs      02/01/18   1138  02/02/18   0749   NA  132*  139   K  4.5  4.6   CL  98  106   CO2  15*  22   BUN  18  20   CREATININE  0.89  0.83   GLUCOSE  179*  106*     Calcium:  Recent Labs      02/02/18   0749   CALCIUM  8.5*     Ionized Calcium:No results for input(s): IONCA in the last 72 hours. Magnesium:No results for input(s): MG in the last 72 hours. Phosphorus:No results for input(s): PHOS in the last 72 hours. BNP:No results for input(s): BNP in the last 72 hours. Glucose:No results for input(s): POCGLU in the last 72 hours. HgbA1C: No results for input(s): LABA1C in the last 72 hours. INR:   Recent Labs      02/01/18   1316   INR  1.5     Hepatic: Recent Labs      02/01/18   1138   ALKPHOS  83   ALT  20   AST  75*   PROT  6.3*   BILITOT  3.77*   LABALBU  2.9*     Amylase and Lipase:  Recent Labs      02/02/18   0749   LACTA  NOT REPORTED     Lactic Acid:   Recent Labs      02/02/18   0749   LACTA  NOT REPORTED     CARDIAC ENZYMES:  Recent Labs      02/01/18   1211  02/01/18   1405   TROPONINI  0.01  0.06     BNP: No results for input(s): BNP in the last 72 hours. Lipids: No results for input(s): CHOL, TRIG, HDL, LDLCALC in the last 72 hours.     Invalid input(s): LDL  ABGs: No results found for: PH, PCO2, PO2, HCO3, O2SAT  Thyroid:   Lab Results   Component Value Date    TSH 2.35 01/02/2018      Urinalysis: Color, UA   Date Value Ref Range Status   02/01/2018 ORANGE (A) YEL Final     Comment:     INTERPRET WITH CAUTION DUE TO INTENSE COLOR OF URINE.     pH, UA   Date Value Ref Range Status   02/01/2018 5.0 5.0 - 8.0 Final     Specific Gravity, UA   Date Value Ref Range Status   02/01/2018 1.037 (H) 1.005 - 1.030 Final     Protein, UA   Date Value Ref Range Status   02/01/2018 examined the patient and the key elements of all parts of the encounter have been performed by me. I agree with the assessment, plan and orders as documented by the resident.      Carloz Canela MD  2/2/2018  11:16 PM

## 2018-02-03 ENCOUNTER — APPOINTMENT (OUTPATIENT)
Dept: CT IMAGING | Age: 73
DRG: 086 | End: 2018-02-03
Payer: COMMERCIAL

## 2018-02-03 LAB
ALBUMIN SERPL-MCNC: 2.6 G/DL (ref 3.5–5.2)
ALBUMIN/GLOBULIN RATIO: 1.1 (ref 1–2.5)
ALP BLD-CCNC: 69 U/L (ref 35–104)
ALT SERPL-CCNC: 23 U/L (ref 5–33)
AST SERPL-CCNC: 78 U/L
BILIRUB SERPL-MCNC: 3.55 MG/DL (ref 0.3–1.2)
BILIRUBIN DIRECT: 1.27 MG/DL
BILIRUBIN, INDIRECT: 2.28 MG/DL (ref 0–1)
DAT, POLYSPECIFIC: NEGATIVE
FERRITIN: 276 UG/L (ref 13–150)
GLOBULIN: ABNORMAL G/DL (ref 1.5–3.8)
GLUCOSE BLD-MCNC: 139 MG/DL (ref 65–105)
HAPTOGLOBIN: <10 MG/DL (ref 30–200)
IRON SATURATION: ABNORMAL % (ref 20–55)
IRON: 128 UG/DL (ref 37–145)
LACTATE DEHYDROGENASE: 385 U/L (ref 135–214)
TOTAL IRON BINDING CAPACITY: ABNORMAL UG/DL (ref 250–450)
TOTAL PROTEIN: 5 G/DL (ref 6.4–8.3)
UNSATURATED IRON BINDING CAPACITY: <17 UG/DL (ref 112–347)

## 2018-02-03 PROCEDURE — 86256 FLUORESCENT ANTIBODY TITER: CPT

## 2018-02-03 PROCEDURE — 6360000002 HC RX W HCPCS: Performed by: STUDENT IN AN ORGANIZED HEALTH CARE EDUCATION/TRAINING PROGRAM

## 2018-02-03 PROCEDURE — 36415 COLL VENOUS BLD VENIPUNCTURE: CPT

## 2018-02-03 PROCEDURE — 74177 CT ABD & PELVIS W/CONTRAST: CPT

## 2018-02-03 PROCEDURE — 83540 ASSAY OF IRON: CPT

## 2018-02-03 PROCEDURE — 86880 COOMBS TEST DIRECT: CPT

## 2018-02-03 PROCEDURE — 82728 ASSAY OF FERRITIN: CPT

## 2018-02-03 PROCEDURE — 83550 IRON BINDING TEST: CPT

## 2018-02-03 PROCEDURE — 6360000004 HC RX CONTRAST MEDICATION: Performed by: INTERNAL MEDICINE

## 2018-02-03 PROCEDURE — 86255 FLUORESCENT ANTIBODY SCREEN: CPT

## 2018-02-03 PROCEDURE — 2060000000 HC ICU INTERMEDIATE R&B

## 2018-02-03 PROCEDURE — 2580000003 HC RX 258: Performed by: STUDENT IN AN ORGANIZED HEALTH CARE EDUCATION/TRAINING PROGRAM

## 2018-02-03 PROCEDURE — 6370000000 HC RX 637 (ALT 250 FOR IP): Performed by: INTERNAL MEDICINE

## 2018-02-03 PROCEDURE — 99233 SBSQ HOSP IP/OBS HIGH 50: CPT | Performed by: INTERNAL MEDICINE

## 2018-02-03 PROCEDURE — 80076 HEPATIC FUNCTION PANEL: CPT

## 2018-02-03 PROCEDURE — 83615 LACTATE (LD) (LDH) ENZYME: CPT

## 2018-02-03 PROCEDURE — 2580000003 HC RX 258: Performed by: INTERNAL MEDICINE

## 2018-02-03 PROCEDURE — 82947 ASSAY GLUCOSE BLOOD QUANT: CPT

## 2018-02-03 PROCEDURE — 83516 IMMUNOASSAY NONANTIBODY: CPT

## 2018-02-03 PROCEDURE — 83010 ASSAY OF HAPTOGLOBIN QUANT: CPT

## 2018-02-03 PROCEDURE — 6370000000 HC RX 637 (ALT 250 FOR IP): Performed by: STUDENT IN AN ORGANIZED HEALTH CARE EDUCATION/TRAINING PROGRAM

## 2018-02-03 PROCEDURE — 2500000003 HC RX 250 WO HCPCS: Performed by: STUDENT IN AN ORGANIZED HEALTH CARE EDUCATION/TRAINING PROGRAM

## 2018-02-03 PROCEDURE — 86038 ANTINUCLEAR ANTIBODIES: CPT

## 2018-02-03 PROCEDURE — 94762 N-INVAS EAR/PLS OXIMTRY CONT: CPT

## 2018-02-03 RX ADMIN — FOLIC ACID: 5 INJECTION, SOLUTION INTRAMUSCULAR; INTRAVENOUS; SUBCUTANEOUS at 08:39

## 2018-02-03 RX ADMIN — CEFTRIAXONE SODIUM 2 G: 2 INJECTION, POWDER, FOR SOLUTION INTRAMUSCULAR; INTRAVENOUS at 05:22

## 2018-02-03 RX ADMIN — THERA TABS 1 TABLET: TAB at 08:21

## 2018-02-03 RX ADMIN — ACETAMINOPHEN 650 MG: 325 TABLET ORAL at 16:41

## 2018-02-03 RX ADMIN — IOPAMIDOL 100 ML: 755 INJECTION, SOLUTION INTRAVENOUS at 10:26

## 2018-02-03 RX ADMIN — Medication 10 ML: at 08:39

## 2018-02-03 ASSESSMENT — PAIN DESCRIPTION - LOCATION: LOCATION: ARM

## 2018-02-03 ASSESSMENT — ENCOUNTER SYMPTOMS
ABDOMINAL PAIN: 0
SPUTUM PRODUCTION: 0
RESPIRATORY NEGATIVE: 1
EYES NEGATIVE: 1
CONSTIPATION: 0
GASTROINTESTINAL NEGATIVE: 1
SHORTNESS OF BREATH: 0
DIARRHEA: 0

## 2018-02-03 ASSESSMENT — PAIN SCALES - GENERAL
PAINLEVEL_OUTOF10: 3
PAINLEVEL_OUTOF10: 2

## 2018-02-03 ASSESSMENT — PAIN DESCRIPTION - ONSET: ONSET: ON-GOING

## 2018-02-03 ASSESSMENT — PAIN DESCRIPTION - PAIN TYPE: TYPE: ACUTE PAIN

## 2018-02-03 ASSESSMENT — PAIN DESCRIPTION - PROGRESSION: CLINICAL_PROGRESSION: GRADUALLY IMPROVING

## 2018-02-03 ASSESSMENT — PAIN DESCRIPTION - DESCRIPTORS: DESCRIPTORS: DULL

## 2018-02-03 ASSESSMENT — PAIN DESCRIPTION - FREQUENCY: FREQUENCY: INTERMITTENT

## 2018-02-03 ASSESSMENT — PAIN DESCRIPTION - ORIENTATION: ORIENTATION: LEFT

## 2018-02-03 NOTE — PLAN OF CARE
Problem: Falls - Risk of  Goal: Absence of falls  Outcome: Ongoing  Pt assessed as a fall risk this shift. Remains free from falls and accidental injury at this time. Fall precautions in place, including falling star sign and fall risk band on pt. Floor free from obstacles, and bed is locked and in lowest position. Adequate lighting provided. Pt encouraged to call before getting Out Of Bed for any need. Bed alarm activated. Will continue to monitor needs during hourly rounding, and reinforce education on use of call light.

## 2018-02-03 NOTE — PROGRESS NOTES
GASTROENTEROLOGY  PROGRESS NOTE       Chief Complaint : Elevated LFT's     Subjective : No compliants . No abd pain , N/V     CURRENT MEDICATIONS:  .  Scheduled Meds:   thiamine and folic acid IVPB   Intravenous Daily    multivitamin  1 tablet Oral Daily    sodium chloride flush  10 mL Intravenous 2 times per day    sodium chloride  30 mL/kg Intravenous Once     . Continuous Infusions:   . PRN Meds:sodium chloride flush, acetaminophen, magnesium hydroxide, ondansetron      PHYSICAL EXAM:    BP (!) 147/44   Pulse 84   Temp 97.3 °F (36.3 °C) (Oral)   Resp 14   Ht 5' 4\" (1.626 m)   Wt 180 lb (81.6 kg)   SpO2 100%   BMI 30.90 kg/m²   . TMAX[24]  . GENERAL: Well developed, Well nourished, No apparent distress  HEAD:  Normocephalic, Atraumatic  EENT: EOMI, Sclera not icteric, Oropharynx moist  NECK:  Supple, Trachea midline  LUNGS:CTA Bilaterally  HEART: RRR, No murmur, No rub, No gallop, PMI nondisplaced. ABDOMEN:Soft, Non tender, Not distended, BS WNL,  No masses. EXT:No clubbing. No cyanosis. No edema. SKIN: No rashes. No jaundice. No stigmata of liver disease. NEURO:  A&O x Three, No focal neurological deficits        LABS and IMAGING:     CBC  Recent Labs      02/01/18   1233  02/02/18   0749  02/02/18   1703   WBC  14.2*  11.6*  11.8*   HGB  10.2*  8.0*  8.7*   MCV  104.4*  108.4*  108.3*   RDW  18.6*  18.5*  18.7*   PLT  67*  See Reflexed IPF Result  See Reflexed IPF Result       ANEMIA STUDIES  No results for input(s): LABIRON, TIBC, FERRITIN, [id number], FOLATE, OCCULTBLD in the last 72 hours.     BMP  Recent Labs      02/01/18   1138  02/02/18   0749   NA  132*  139   K  4.5  4.6   CL  98  106   CO2  15*  22   BUN  18  20   CREATININE  0.89  0.83   GLUCOSE  179*  106*   CALCIUM  8.9  8.5*       LFTS  Recent Labs      02/01/18   1138  02/02/18   0749  02/03/18   0621   ALKPHOS  83  71  69   ALT  20  20  23   AST  75*  81*  78*   BILITOT  3.77*  2.76*  3.55*   BILIDIR   --   1.00*  1.27* LABALBU  2.9*  2.2*  2.6*       AMYLASE/LIPASE/AMMONIA  No results for input(s): AMYLASE, LIPASE, AMMONIA in the last 72 hours. PT/INR  Recent Labs      02/01/18   1316   PROTIME  16.6*   INR  1.5         ASSESSMENT AND PLAN:      1. Elevated LFT's most likley from underlying Cirrhosis based on labs - Plt 67,  Bili 3.55 , INR 1.5 , AST 78  Viral hepatitis panel - negative   Order JEANIE, AMA,SMAB , Iron studies   Monitor LFT's     2. Breast mass with multiple liver lesions on MRI - Need breast biopsy will defer to Primary team   Pt upset to know the findings       Electronically signed by:   Nellie Murphy M.D.  2/3/2018    4:03 PM

## 2018-02-03 NOTE — PROGRESS NOTES
[facility] Oncology Progress Note  2/3/2018 2:02 PM  Subjective:   Admit Date: 2/1/2018  PCP: Telma Riddle MD     Reason for consult:  Anemia/thrombocytopenia   Interval History: Patient is in her room with a sitter, concerns that she may want \"sleep and not wake up. \" She denies any bleeding. She has chronic anemia and thrombocytopenia since 2015, but did not follow up. She was admitted for fall. CT scan of abd/pelvis shows a large left breast mass measuring 4.4 cm with satellite lesion mesuring 1.1 cm, suspicious for primary breast cancer. She states she does not do self breast exams and has not had a mammogram in years. The liver showed liver lesions small and multiple and AFP is elevated at 11. She states she is unsure if she will proceed with biopsy. Diet: Dietary Nutrition Supplements: Diabetic Oral Supplement  DIET GENERAL;  Medications:   Scheduled Meds:   thiamine and folic acid IVPB   Intravenous Daily    multivitamin  1 tablet Oral Daily    sodium chloride flush  10 mL Intravenous 2 times per day    sodium chloride  30 mL/kg Intravenous Once     Continuous Infusions:   CBC:   Recent Labs      02/01/18   1233  02/02/18   0749  02/02/18   1703   WBC  14.2*  11.6*  11.8*   HGB  10.2*  8.0*  8.7*   PLT  67*  See Reflexed IPF Result  See Reflexed IPF Result     BMP:    Recent Labs      02/01/18   1138  02/02/18   0749   NA  132*  139   K  4.5  4.6   CL  98  106   CO2  15*  22   BUN  18  20   CREATININE  0.89  0.83   GLUCOSE  179*  106*     Hepatic:   Recent Labs      02/01/18   1138  02/02/18   0749  02/03/18   0621   AST  75*  81*  78*   ALT  20  20  23   BILITOT  3.77*  2.76*  3.55*   ALKPHOS  83  71  69     INR:   Recent Labs      02/01/18   1316   INR  1.5     AFP: 11.2  CT scan of chest/abd pelvis:  1. Large left breast mass measuring up to 4.4 cm with satellite lesion measuring 1.1 cm, suspicious for primary breast cancer. A left axillary lymph node is partially visualized.

## 2018-02-03 NOTE — PROGRESS NOTES
kg) (stated)  · Usual Body Wt: 180 lb (81.6 kg) (stated)  · Ideal Body Wt: 120 lb (54.4 kg), % Ideal Body 150%  · BMI Classification: BMI 30.0 - 34.9 Obese Class I  · Comparative Standards (Estimated Nutrition Needs):  · Estimated Daily Total Kcal: 5963-5712 kcals/day  · Estimated Daily Protein (g): 65-75 g/day    Estimated Intake vs Estimated Needs: Intake Less Than Needs    Nutrition Risk Level: Moderate    Nutrition Interventions:   Modify current diet, Continue current ONS  Continued Inpatient Monitoring, Education Initiated (Reviewed snack ideas for home. Discussed importance of adequate po intake. List of recommended food items provided.)    Nutrition Evaluation:   · Evaluation: Goals set   · Goals: Consistent po intake >50% of meals. · Monitoring: Meal Intake, Supplement Intake, Diet Tolerance, Weight, Comparative Standards, Pertinent Labs    See Adult Nutrition Doc Flowsheet for more detail.      Electronically signed by Sera Nur MS, RD, LD on 2/3/18 at 2:22 PM    Contact Number: [phone]

## 2018-02-03 NOTE — PLAN OF CARE
Problem: Nutrition  Goal: Optimal nutrition therapy  Outcome: Ongoing  Nutrition Problem: Inadequate oral intake  Intervention: Food and/or Nutrient Delivery: Modify current diet, Continue current ONS  Nutritional Goals: Consistent po intake >50% of meals.

## 2018-02-03 NOTE — PLAN OF CARE
Problem: Falls - Risk of  Goal: Absence of falls  Outcome: Ongoing      Problem: Pain:  Goal: Control of acute pain  Control of acute pain   Outcome: Ongoing

## 2018-02-04 ENCOUNTER — APPOINTMENT (OUTPATIENT)
Dept: MRI IMAGING | Age: 73
DRG: 086 | End: 2018-02-04
Payer: COMMERCIAL

## 2018-02-04 ENCOUNTER — APPOINTMENT (OUTPATIENT)
Dept: ULTRASOUND IMAGING | Age: 73
DRG: 086 | End: 2018-02-04
Payer: COMMERCIAL

## 2018-02-04 LAB
ALBUMIN SERPL-MCNC: 2.3 G/DL (ref 3.5–5.2)
ALBUMIN/GLOBULIN RATIO: 0.9 (ref 1–2.5)
ALP BLD-CCNC: 76 U/L (ref 35–104)
ALT SERPL-CCNC: 26 U/L (ref 5–33)
ANION GAP SERPL CALCULATED.3IONS-SCNC: 9 MMOL/L (ref 9–17)
AST SERPL-CCNC: 74 U/L
BILIRUB SERPL-MCNC: 3.13 MG/DL (ref 0.3–1.2)
BILIRUBIN DIRECT: 1.32 MG/DL
BILIRUBIN, INDIRECT: 1.81 MG/DL (ref 0–1)
BUN BLDV-MCNC: 15 MG/DL (ref 8–23)
BUN/CREAT BLD: ABNORMAL (ref 9–20)
CALCIUM SERPL-MCNC: 8.3 MG/DL (ref 8.6–10.4)
CHLORIDE BLD-SCNC: 104 MMOL/L (ref 98–107)
CO2: 22 MMOL/L (ref 20–31)
CREAT SERPL-MCNC: 0.73 MG/DL (ref 0.5–0.9)
GFR AFRICAN AMERICAN: >60 ML/MIN
GFR NON-AFRICAN AMERICAN: >60 ML/MIN
GFR SERPL CREATININE-BSD FRML MDRD: ABNORMAL ML/MIN/{1.73_M2}
GFR SERPL CREATININE-BSD FRML MDRD: ABNORMAL ML/MIN/{1.73_M2}
GLOBULIN: ABNORMAL G/DL (ref 1.5–3.8)
GLUCOSE BLD-MCNC: 101 MG/DL (ref 65–105)
GLUCOSE BLD-MCNC: 125 MG/DL (ref 65–105)
GLUCOSE BLD-MCNC: 182 MG/DL (ref 65–105)
GLUCOSE BLD-MCNC: 94 MG/DL (ref 70–99)
HCT VFR BLD CALC: 22.7 % (ref 36.3–47.1)
HEMOGLOBIN: 7.5 G/DL (ref 11.9–15.1)
MCH RBC QN AUTO: 37.7 PG (ref 25.2–33.5)
MCHC RBC AUTO-ENTMCNC: 33 G/DL (ref 28.4–34.8)
MCV RBC AUTO: 114.1 FL (ref 82.6–102.9)
NRBC AUTOMATED: 0.3 PER 100 WBC
PDW BLD-RTO: 19.1 % (ref 11.8–14.4)
PLATELET # BLD: ABNORMAL K/UL (ref 138–453)
PLATELET, FLUORESCENCE: 41 K/UL (ref 138–453)
PLATELET, IMMATURE FRACTION: 4.9 % (ref 1.1–10.3)
PMV BLD AUTO: ABNORMAL FL (ref 8.1–13.5)
POTASSIUM SERPL-SCNC: 3.8 MMOL/L (ref 3.7–5.3)
RBC # BLD: 1.99 M/UL (ref 3.95–5.11)
SODIUM BLD-SCNC: 135 MMOL/L (ref 135–144)
TOTAL PROTEIN: 4.8 G/DL (ref 6.4–8.3)
WBC # BLD: 6.5 K/UL (ref 3.5–11.3)

## 2018-02-04 PROCEDURE — 80076 HEPATIC FUNCTION PANEL: CPT

## 2018-02-04 PROCEDURE — 2500000003 HC RX 250 WO HCPCS: Performed by: STUDENT IN AN ORGANIZED HEALTH CARE EDUCATION/TRAINING PROGRAM

## 2018-02-04 PROCEDURE — 6370000000 HC RX 637 (ALT 250 FOR IP): Performed by: STUDENT IN AN ORGANIZED HEALTH CARE EDUCATION/TRAINING PROGRAM

## 2018-02-04 PROCEDURE — A9579 GAD-BASE MR CONTRAST NOS,1ML: HCPCS | Performed by: HOSPITALIST

## 2018-02-04 PROCEDURE — 99233 SBSQ HOSP IP/OBS HIGH 50: CPT | Performed by: INTERNAL MEDICINE

## 2018-02-04 PROCEDURE — 80048 BASIC METABOLIC PNL TOTAL CA: CPT

## 2018-02-04 PROCEDURE — 97110 THERAPEUTIC EXERCISES: CPT

## 2018-02-04 PROCEDURE — 6370000000 HC RX 637 (ALT 250 FOR IP): Performed by: INTERNAL MEDICINE

## 2018-02-04 PROCEDURE — 82947 ASSAY GLUCOSE BLOOD QUANT: CPT

## 2018-02-04 PROCEDURE — 2060000000 HC ICU INTERMEDIATE R&B

## 2018-02-04 PROCEDURE — 6360000004 HC RX CONTRAST MEDICATION: Performed by: HOSPITALIST

## 2018-02-04 PROCEDURE — 85055 RETICULATED PLATELET ASSAY: CPT

## 2018-02-04 PROCEDURE — 94762 N-INVAS EAR/PLS OXIMTRY CONT: CPT

## 2018-02-04 PROCEDURE — 2580000003 HC RX 258: Performed by: HOSPITALIST

## 2018-02-04 PROCEDURE — 85027 COMPLETE CBC AUTOMATED: CPT

## 2018-02-04 PROCEDURE — 6360000002 HC RX W HCPCS: Performed by: STUDENT IN AN ORGANIZED HEALTH CARE EDUCATION/TRAINING PROGRAM

## 2018-02-04 PROCEDURE — 70553 MRI BRAIN STEM W/O & W/DYE: CPT

## 2018-02-04 PROCEDURE — 2580000003 HC RX 258: Performed by: STUDENT IN AN ORGANIZED HEALTH CARE EDUCATION/TRAINING PROGRAM

## 2018-02-04 PROCEDURE — 97116 GAIT TRAINING THERAPY: CPT

## 2018-02-04 PROCEDURE — 6370000000 HC RX 637 (ALT 250 FOR IP): Performed by: PSYCHIATRY & NEUROLOGY

## 2018-02-04 PROCEDURE — 36415 COLL VENOUS BLD VENIPUNCTURE: CPT

## 2018-02-04 RX ORDER — TRAZODONE HYDROCHLORIDE 50 MG/1
50 TABLET ORAL NIGHTLY PRN
Status: DISCONTINUED | OUTPATIENT
Start: 2018-02-04 | End: 2018-02-06 | Stop reason: HOSPADM

## 2018-02-04 RX ORDER — SODIUM CHLORIDE 0.9 % (FLUSH) 0.9 %
10 SYRINGE (ML) INJECTION 2 TIMES DAILY
Status: DISCONTINUED | OUTPATIENT
Start: 2018-02-04 | End: 2018-02-06 | Stop reason: HOSPADM

## 2018-02-04 RX ADMIN — SERTRALINE 100 MG: 50 TABLET, FILM COATED ORAL at 14:11

## 2018-02-04 RX ADMIN — FOLIC ACID: 5 INJECTION, SOLUTION INTRAMUSCULAR; INTRAVENOUS; SUBCUTANEOUS at 14:11

## 2018-02-04 RX ADMIN — Medication 10 ML: at 14:11

## 2018-02-04 RX ADMIN — ACETAMINOPHEN 650 MG: 325 TABLET ORAL at 14:00

## 2018-02-04 RX ADMIN — ACETAMINOPHEN 650 MG: 325 TABLET ORAL at 04:02

## 2018-02-04 RX ADMIN — THERA TABS 1 TABLET: TAB at 14:13

## 2018-02-04 RX ADMIN — GADOPENTETATE DIMEGLUMINE 16 ML: 469.01 INJECTION INTRAVENOUS at 12:52

## 2018-02-04 ASSESSMENT — ENCOUNTER SYMPTOMS
SHORTNESS OF BREATH: 0
SPUTUM PRODUCTION: 0
ABDOMINAL PAIN: 0
GASTROINTESTINAL NEGATIVE: 1
EYES NEGATIVE: 1
CONSTIPATION: 0
DIARRHEA: 0
RESPIRATORY NEGATIVE: 1

## 2018-02-04 ASSESSMENT — PAIN SCALES - GENERAL
PAINLEVEL_OUTOF10: 7
PAINLEVEL_OUTOF10: 4

## 2018-02-04 NOTE — CARE COORDINATION
Met with pt to discuss snf  Pt not sure about going to snf. Provided pt with list, freedom of choice. Pt voiced many concerns:  Not having help at home, mobility, support. Pt does go to [place] and has a . Pt does not know if she has a  with [employer]. Has a son, but pt has not had contact for 11 years. Pt states she had 3 falls in the last 2 weeks. Suggestions given. Pt states she doesn't know how to do any of them.

## 2018-02-04 NOTE — PROGRESS NOTES
IM RESIDENT PROGRESS NOTE        Patient:  Jacinto Reza  YOB: 1945    MRN: [id number]     Acct: [de-identified]     Admit date: 2/1/2018    Pt seen and Chart reviewed. Subjective:   No acute issues overnight  Was sad after knowing the results of CT abdomen  No chest pain, shortness of breath  Dental hygiene poor    Review of Systems   Constitutional: Negative for chills and fever. HENT: Negative. Eyes: Negative. Respiratory: Negative. Negative for sputum production and shortness of breath. Cardiovascular: Negative. Negative for chest pain and palpitations. Gastrointestinal: Negative. Negative for abdominal pain, constipation and diarrhea. Genitourinary: Negative. Musculoskeletal: Negative. Skin: Negative. Neurological: Negative. Endo/Heme/Allergies: Negative. Psychiatric/Behavioral: Positive for suicidal ideas. Negative for hallucinations and substance abuse. Diet:  Dietary Nutrition Supplements: Diabetic Oral Supplement  DIET GENERAL;      Medications:Current Inpatient    Scheduled Meds:   thiamine and folic acid IVPB   Intravenous Daily    multivitamin  1 tablet Oral Daily    sodium chloride flush  10 mL Intravenous 2 times per day    sodium chloride  30 mL/kg Intravenous Once     Continuous Infusions:     PRN Meds:sodium chloride flush, acetaminophen, magnesium hydroxide, ondansetron    Objective:    Physical Exam:  Vitals: BP (!) 142/37   Pulse 80   Temp 97.9 °F (36.6 °C) (Oral)   Resp 12   Ht 5' 4\" (1.626 m)   Wt 180 lb (81.6 kg)   SpO2 97%   BMI 30.90 kg/m²   24 hour intake/output:    Intake/Output Summary (Last 24 hours) at 02/04/18 0814  Last data filed at 02/03/18 1800   Gross per 24 hour   Intake             1120 ml   Output              800 ml   Net              320 ml     Last 3 weights:   Wt Readings from Last 3 Encounters:   02/01/18 180 lb (81.6 kg)   01/02/18 180 lb vitals positive  2 D ECHO reviewed- hyperdynamic, EF>60%, Mild to mod aortic stenosis, trivial MR  PT eval     DM type 2  HBA1c levels   Low dose insulin sliding scale  Glucose checks ACHS     Hypertension  /37  Labetalol PRN     Anemia- baseline around 12  Hb 7.5 stable  .3  Vitamin B 12- 1887  folate levels <2.0  IV folate for 3 days  Heptoglobin < 10     Thrombocytopenia  41 k  USG whole abdomen reviewed- no splenomegaly  peripheral smear pending   increased retic count- will get LDH, haptoglobin and direct coomb`s test  baseline in 50`s - 80`s     Sepsis likely 2/2 UTI  WBC count 14.2--11.6--11.8 today  On IV rocephin  UA positive  Urine culture negative so far    Elevated Bilirubin  GI consulted  Will trend LFT`s  CT abdomen liver mass and breast mass  Hepatitis panel- negative    Suicide ideas-  Suicide precaution  Psych consult      EPC cuffs for DVT prophylaxis No AC for 1 week per neurosurgery     for placement    Enrique Nieto MD             2/4/2018, 8:14 AM

## 2018-02-04 NOTE — PROGRESS NOTES
Physical Therapy  Facility/Department: [facility] NEURO  Daily Treatment Note  NAME: Eli Booth  : 1945  MRN: [id number]    Date of Service: 2018    Patient Diagnosis(es):   Patient Active Problem List    Diagnosis Date Noted    Lactic acidosis 2018    Intraparenchymal hemorrhage of brain ([facility] 2018    Recurrent falls 2018    Thrombocytopenia ([facility] 2018    Sepsis due to urinary tract infection ([facility] 2018    Elevated LFTs 2018    DM (diabetes mellitus) ([facility] 2014    HLD (hyperlipidemia) 2014    HTN (hypertension) 2014    Obesity 2014    Depression 2014       Past Medical History:   Diagnosis Date    Anxiety     Arthritis     Depression     HLD (hyperlipidemia) 2014    HTN (hypertension) 2014    Obesity     Osteoarthritis     knee    Osteopenia     dexa  left hip    Type II or unspecified type diabetes mellitus without mention of complication, not stated as uncontrolled      Past Surgical History:   Procedure Laterality Date    SHOULDER SURGERY Right     metal plate       Restrictions  Restrictions/Precautions  Restrictions/Precautions: Weight Bearing, Fall Risk  Required Braces or Orthoses?: Yes  Upper Extremity Weight Bearing Restrictions  Left Upper Extremity Weight Bearing: Non Weight Bearing  Required Braces or Orthoses  Left Upper Extremity Brace/Splint: Sling  Subjective   General  Response To Previous Treatment: Patient with no complaints from previous session. Family / Caregiver Present: No  Subjective  Subjective: Pt resting in bed upon arrival, upset re: POC, and feeling she has not been well informed.  Perseverates re: NOT wanting family contacted  Pain Screening  Patient Currently in Pain: Denies  Vital Signs  Patient Currently in Pain: Denies       Orientation  Orientation  Overall Orientation Status: Within Functional Limits  Objective   Bed mobility  Rolling to Left: Minimal Recommendations: Strengthening, ROM, Balance Training, Functional Mobility Training, Transfer Training, Endurance Training, Gait Training, Stair training, Neuromuscular Re-education, Cognitive Reorientation, Pain Management, Home Exercise Program, Safety Education & Training, Patient/Caregiver Education & Training, Equipment Evaluation, Education, & procurement, Positioning  Safety Devices  Type of devices: Call light within reach, Bed alarm in place, Gait belt, Patient at risk for falls  Restraints  Initially in place: No     Therapy Time   Individual Concurrent Group Co-treatment   Time In 0845         Time Out 6771         Minutes [address] [place], [place]

## 2018-02-04 NOTE — PROGRESS NOTES
Patient not in room, went down for MRI. Hopefully, patient will be agreeable for biopsy tomorrow and can transfuse platelets prior to biopsy, ideally would prefer biopsy of liver mass for staging purposes. Given that she has an elevated AFP and breast mass.

## 2018-02-05 ENCOUNTER — APPOINTMENT (OUTPATIENT)
Dept: NUCLEAR MEDICINE | Age: 73
DRG: 086 | End: 2018-02-05
Payer: COMMERCIAL

## 2018-02-05 LAB
ALBUMIN (CALCULATED): 3 G/DL (ref 3.2–5.2)
ALBUMIN PERCENT: 57 % (ref 45–65)
ALBUMIN SERPL-MCNC: 2.6 G/DL (ref 3.5–5.2)
ALBUMIN/GLOBULIN RATIO: 1.1 (ref 1–2.5)
ALP BLD-CCNC: 80 U/L (ref 35–104)
ALPHA 1 PERCENT: 3 % (ref 3–6)
ALPHA 2 PERCENT: 6 % (ref 6–13)
ALPHA-1-GLOBULIN: 0.2 G/DL (ref 0.1–0.4)
ALPHA-2-GLOBULIN: 0.3 G/DL (ref 0.5–0.9)
ALT SERPL-CCNC: 27 U/L (ref 5–33)
ANION GAP SERPL CALCULATED.3IONS-SCNC: 10 MMOL/L (ref 9–17)
ANTI-NUCLEAR ANTIBODY (ANA): NEGATIVE
AST SERPL-CCNC: 67 U/L
BETA GLOBULIN: 0.5 G/DL (ref 0.5–1.1)
BETA PERCENT: 9 % (ref 11–19)
BILIRUB SERPL-MCNC: 3.72 MG/DL (ref 0.3–1.2)
BILIRUBIN DIRECT: 1.6 MG/DL
BILIRUBIN, INDIRECT: 2.12 MG/DL (ref 0–1)
BUN BLDV-MCNC: 15 MG/DL (ref 8–23)
BUN/CREAT BLD: ABNORMAL (ref 9–20)
CALCIUM SERPL-MCNC: 8.3 MG/DL (ref 8.6–10.4)
CHLORIDE BLD-SCNC: 104 MMOL/L (ref 98–107)
CO2: 22 MMOL/L (ref 20–31)
CREAT SERPL-MCNC: 0.67 MG/DL (ref 0.5–0.9)
GAMMA GLOBULIN %: 25 % (ref 9–20)
GAMMA GLOBULIN: 1.3 G/DL (ref 0.5–1.5)
GFR AFRICAN AMERICAN: >60 ML/MIN
GFR NON-AFRICAN AMERICAN: >60 ML/MIN
GFR SERPL CREATININE-BSD FRML MDRD: ABNORMAL ML/MIN/{1.73_M2}
GFR SERPL CREATININE-BSD FRML MDRD: ABNORMAL ML/MIN/{1.73_M2}
GLOBULIN: ABNORMAL G/DL (ref 1.5–3.8)
GLUCOSE BLD-MCNC: 101 MG/DL (ref 70–99)
GLUCOSE BLD-MCNC: 102 MG/DL (ref 65–105)
GLUCOSE BLD-MCNC: 110 MG/DL (ref 65–105)
GLUCOSE BLD-MCNC: 111 MG/DL (ref 65–105)
GLUCOSE BLD-MCNC: 146 MG/DL (ref 65–105)
HCT VFR BLD CALC: 22.6 % (ref 36.3–47.1)
HEMOGLOBIN: 7.8 G/DL (ref 11.9–15.1)
MCH RBC QN AUTO: 37.9 PG (ref 25.2–33.5)
MCHC RBC AUTO-ENTMCNC: 34.5 G/DL (ref 28.4–34.8)
MCV RBC AUTO: 109.7 FL (ref 82.6–102.9)
MITOCHONDRIAL ANTIBODY: 3.6 UNITS (ref 0–20)
NRBC AUTOMATED: 0.3 PER 100 WBC
PATHOLOGIST REVIEW: NORMAL
PATHOLOGIST: ABNORMAL
PATHOLOGIST: NORMAL
PDW BLD-RTO: 19.3 % (ref 11.8–14.4)
PLATELET # BLD: ABNORMAL K/UL (ref 138–453)
PLATELET, FLUORESCENCE: 46 K/UL (ref 138–453)
PLATELET, IMMATURE FRACTION: 4.7 % (ref 1.1–10.3)
PMV BLD AUTO: ABNORMAL FL (ref 8.1–13.5)
POTASSIUM SERPL-SCNC: 4.1 MMOL/L (ref 3.7–5.3)
PROTEIN ELECTROPHORESIS, SERUM: ABNORMAL
RBC # BLD: 2.06 M/UL (ref 3.95–5.11)
SERUM IFX INTERP: NORMAL
SMOOTH MUSCLE ANTIBODY: NORMAL
SODIUM BLD-SCNC: 136 MMOL/L (ref 135–144)
SURGICAL PATHOLOGY REPORT: NORMAL
TOTAL PROT. SUM,%: 100 % (ref 98–102)
TOTAL PROT. SUM: 5.3 G/DL (ref 6.3–8.2)
TOTAL PROTEIN: 5 G/DL (ref 6.4–8.3)
TOTAL PROTEIN: 5.3 G/DL (ref 6.4–8.3)
WBC # BLD: 7.5 K/UL (ref 3.5–11.3)

## 2018-02-05 PROCEDURE — 99221 1ST HOSP IP/OBS SF/LOW 40: CPT | Performed by: FAMILY MEDICINE

## 2018-02-05 PROCEDURE — 2580000003 HC RX 258: Performed by: HOSPITALIST

## 2018-02-05 PROCEDURE — 85027 COMPLETE CBC AUTOMATED: CPT

## 2018-02-05 PROCEDURE — 6370000000 HC RX 637 (ALT 250 FOR IP): Performed by: PSYCHIATRY & NEUROLOGY

## 2018-02-05 PROCEDURE — 82947 ASSAY GLUCOSE BLOOD QUANT: CPT

## 2018-02-05 PROCEDURE — 97110 THERAPEUTIC EXERCISES: CPT

## 2018-02-05 PROCEDURE — 97530 THERAPEUTIC ACTIVITIES: CPT

## 2018-02-05 PROCEDURE — 97535 SELF CARE MNGMENT TRAINING: CPT

## 2018-02-05 PROCEDURE — 80076 HEPATIC FUNCTION PANEL: CPT

## 2018-02-05 PROCEDURE — 94762 N-INVAS EAR/PLS OXIMTRY CONT: CPT

## 2018-02-05 PROCEDURE — 6370000000 HC RX 637 (ALT 250 FOR IP): Performed by: STUDENT IN AN ORGANIZED HEALTH CARE EDUCATION/TRAINING PROGRAM

## 2018-02-05 PROCEDURE — 36415 COLL VENOUS BLD VENIPUNCTURE: CPT

## 2018-02-05 PROCEDURE — 85055 RETICULATED PLATELET ASSAY: CPT

## 2018-02-05 PROCEDURE — 2060000000 HC ICU INTERMEDIATE R&B

## 2018-02-05 PROCEDURE — 6370000000 HC RX 637 (ALT 250 FOR IP): Performed by: INTERNAL MEDICINE

## 2018-02-05 PROCEDURE — 80048 BASIC METABOLIC PNL TOTAL CA: CPT

## 2018-02-05 PROCEDURE — 2580000003 HC RX 258: Performed by: INTERNAL MEDICINE

## 2018-02-05 RX ADMIN — THERA TABS 1 TABLET: TAB at 09:29

## 2018-02-05 RX ADMIN — Medication 10 ML: at 09:40

## 2018-02-05 RX ADMIN — Medication 10 ML: at 20:44

## 2018-02-05 RX ADMIN — ACETAMINOPHEN 650 MG: 325 TABLET ORAL at 04:02

## 2018-02-05 RX ADMIN — SERTRALINE 100 MG: 50 TABLET, FILM COATED ORAL at 09:29

## 2018-02-05 ASSESSMENT — ENCOUNTER SYMPTOMS
EYES NEGATIVE: 1
SHORTNESS OF BREATH: 0
CONSTIPATION: 0
GASTROINTESTINAL NEGATIVE: 1
ABDOMINAL PAIN: 0
SPUTUM PRODUCTION: 0
RESPIRATORY NEGATIVE: 1
DIARRHEA: 0

## 2018-02-05 ASSESSMENT — PAIN DESCRIPTION - LOCATION
LOCATION: ARM
LOCATION: SHOULDER;ARM

## 2018-02-05 ASSESSMENT — PAIN DESCRIPTION - ORIENTATION
ORIENTATION: LEFT
ORIENTATION: LEFT

## 2018-02-05 ASSESSMENT — PAIN DESCRIPTION - DESCRIPTORS: DESCRIPTORS: ACHING

## 2018-02-05 ASSESSMENT — PAIN DESCRIPTION - PAIN TYPE
TYPE: ACUTE PAIN
TYPE: ACUTE PAIN

## 2018-02-05 ASSESSMENT — PAIN SCALES - GENERAL
PAINLEVEL_OUTOF10: 1
PAINLEVEL_OUTOF10: 5
PAINLEVEL_OUTOF10: 3

## 2018-02-05 ASSESSMENT — PAIN DESCRIPTION - FREQUENCY: FREQUENCY: CONTINUOUS

## 2018-02-05 ASSESSMENT — PAIN DESCRIPTION - ONSET: ONSET: ON-GOING

## 2018-02-05 NOTE — PROGRESS NOTES
from Last 3 Encounters:   02/01/18 180 lb (81.6 kg)   01/02/18 180 lb (81.6 kg)   05/19/15 196 lb (88.9 kg)       Physical Examination:   Physical Exam   Constitutional: She is oriented to person, place, and time. She appears well-developed and well-nourished. HENT:   Head: Normocephalic and atraumatic. Dental hygiene poor   Eyes: Pupils are equal, round, and reactive to light. Neck: Normal range of motion. Cardiovascular: Normal rate and regular rhythm. Murmur (AS murmur) heard. Pulmonary/Chest: Effort normal.   Abdominal: Soft. She exhibits no distension. There is no tenderness. Musculoskeletal: Normal range of motion. She exhibits no edema. Neurological: She is alert and oriented to person, place, and time. Skin: Skin is warm. Labs:-    CBC:   Recent Labs      02/02/18   1703  02/04/18   0547  02/05/18   0613   WBC  11.8*  6.5  7.5   HGB  8.7*  7.5*  7.8*   PLT  See Reflexed IPF Result  See Reflexed IPF Result  See Reflexed IPF Result     BMP:    Recent Labs      02/04/18   0547  02/05/18   0613   NA  135  136   K  3.8  4.1   CL  104  104   CO2  22  22   BUN  15  15   CREATININE  0.73  0.67   GLUCOSE  94  101*     Calcium:  Recent Labs      02/05/18   0613   CALCIUM  8.3*     Ionized Calcium:No results for input(s): IONCA in the last 72 hours. Magnesium:No results for input(s): MG in the last 72 hours. Phosphorus:No results for input(s): PHOS in the last 72 hours. BNP:No results for input(s): BNP in the last 72 hours. Glucose:  Recent Labs      02/04/18   1559  02/04/18   2037  02/05/18   0828   POCGLU  125*  182*  102     HgbA1C: No results for input(s): LABA1C in the last 72 hours. INR:   No results for input(s): INR in the last 72 hours. Hepatic:   Recent Labs      02/05/18   0613   ALKPHOS  80   ALT  27   AST  67*   PROT  5.0*   BILITOT  3.72*   BILIDIR  1.60*   LABALBU  2.6*     Amylase and Lipase:  No results for input(s): LACTA, AMYLASE in the last 72 hours.   Lactic Acid: No results for input(s): LACTA in the last 72 hours. CARDIAC ENZYMES:  No results for input(s): CKTOTAL, CKMB, CKMBINDEX, TROPONINI in the last 72 hours. BNP: No results for input(s): BNP in the last 72 hours. Lipids: No results for input(s): CHOL, TRIG, HDL, LDLCALC in the last 72 hours.     Invalid input(s): LDL  ABGs: No results found for: PH, PCO2, PO2, HCO3, O2SAT  Thyroid:   Lab Results   Component Value Date    TSH 2.35 01/02/2018      Urinalysis:   Color, UA   Date Value Ref Range Status   02/01/2018 ORANGE (A) YEL Final     Comment:     INTERPRET WITH CAUTION DUE TO INTENSE COLOR OF URINE.     pH, UA   Date Value Ref Range Status   02/01/2018 5.0 5.0 - 8.0 Final     Specific Gravity, UA   Date Value Ref Range Status   02/01/2018 1.037 (H) 1.005 - 1.030 Final     Protein, UA   Date Value Ref Range Status   02/01/2018 TRACE (A) NEG Final     RBC, UA   Date Value Ref Range Status   02/01/2018 2 TO 5 0 - 2 /HPF Final     Bacteria, UA   Date Value Ref Range Status   02/01/2018 FEW (A) NONE Final     Nitrite, Urine   Date Value Ref Range Status   02/01/2018 POSITIVE (A) NEG Final     WBC, UA   Date Value Ref Range Status   02/01/2018 10 TO 20 0 - 5 /HPF Final     Leukocyte Esterase, Urine   Date Value Ref Range Status   02/01/2018 SMALL (A) NEG Final     Yeast, UA   Date Value Ref Range Status   02/01/2018 NOT REPORTED NONE Final     Glucose, Ur   Date Value Ref Range Status   02/01/2018 NEGATIVE NEG Final     Bilirubin Urine   Date Value Ref Range Status   02/01/2018 SMALL (A) NEG Final       Assessment:  Active Problems:    Depression    Lactic acidosis    Intraparenchymal hemorrhage of brain (HCC)    Recurrent falls    Thrombocytopenia (HCC)    Sepsis due to urinary tract infection (HCC)    Elevated LFTs      Plan:  Liver and breast mass  Will obtain breast biopsy- patient not agreeing at this time  Heme onc on board  NM bone scan to rule out bone metastases  MRI brain reveiwed    Multiple

## 2018-02-05 NOTE — CARE COORDINATION
Social work consult received this date  Chart reviewed  Case discussed with RN . RN states palliative was consulted today due patients cancer diagnosis. Noted PT/OT following also  Case also discussed with 5C casemgr.   Casemgr will continue to follow for  d/c planning

## 2018-02-05 NOTE — PROGRESS NOTES
BUN  15  15   CREATININE  0.73  0.67   GLUCOSE  94  101*   CALCIUM  8.3*  8.3*       LFTS  Recent Labs      02/03/18   0621  02/04/18   0547  02/05/18   0613   ALKPHOS  69  76  80   ALT  23  26  27   AST  78*  74*  67*   BILITOT  3.55*  3.13*  3.72*   BILIDIR  1.27*  1.32*  1.60*   LABALBU  2.6*  2.3*  2.6*       AMYLASE/LIPASE/AMMONIA  No results for input(s): AMYLASE, LIPASE, AMMONIA in the last 72 hours. PT/INR  No results for input(s): PROTIME, INR in the last 72 hours. ASSESSMENT AND PLAN:      1. Elevated LFT's most likley from underlying Cirrhosis based on labs - improved   Plt 67,  Bili 3.55 , INR 1.5 , AST 67  Viral hepatitis panel - negative   JEANIE - negative   F/U  AMA,SMAB , Iron studies-pending  Monitor LFT's - relatively unchanged     2.  Breast mass with multiple liver lesions on CT - Need breast biopsy will defer to Primary team   Pt upset to know the findings could be malignancy but quickly changes subject  -Total bone scan ordered per primary  -Transvaginal ultrasound ordered to assess CT findings concerning for distended endometrial cavity per primary    Electronically signed by:  Harry Woo M.D.  2/5/2018    12:38 PM

## 2018-02-05 NOTE — CONSULTS
Palliative Care Inpatient Consult    NAME:  Clement Montoya RECORD NUMBER:  [id number]  AGE: 74 y.o. GENDER: female  : 1945  TODAY'S DATE:  2018    Reasons for Consultation:    Symptom and/or pain management  Provision of information regarding PC and/or hospice philosophies  Complex, time-intensive communication and interdisciplinary psychosocial support  Clarification of goals of care and/or assistance with difficult decision-making  Guidance in regards to resources and transition(s)    Members of PC team contributing to this consultation are :  Dr. Noe Foote palliative care attending  History of Present Illness     The patient is a 74 y.o. Non-/non  female who presents with Fall      Referred to Palliative Care by   [x] Physician   [] Nursing  [] Family Request   [] Other:       She was admitted to the Internal medicine service for Intraparenchymal hemorrhage of brain ([facility] [address] [I61.9]. Her hospital course has been associated with <principal problem not specified>. The patient has a complicated medical history and has been hospitalized since 2018 11:01 AM. The patient was brought to the ER by the EMS when patient had called them after a fall. The patient lives alone in her house and had fallen the day before at the admission here and had been treated for a nondisplaced fracture of her left humerus and then she was discharged home with a sling. The patient has been having multiple falls. The patient also complained of fatigue, poor intake, loss of appetite and also had been losing weight. The patient has history of depression for which she takes Zoloft and trazodone for insomnia and goes to [facility] for these medications. The patient also has history of chronic thrombocytopenia and anemia. Patient has very poor dental hygiene.  The patient had a CT abdomen and pelvis done which showed large left breast mass measuring about 5.4 cm and also many satellite lesions in the liver and also 0    ibuprofen (ADVIL;MOTRIN) 800 MG tablet Take 1 tablet by mouth every 8 hours as needed for Pain 30 tablet 0    folic acid (FOLVITE) 1 MG tablet Take 1 tablet by mouth daily 30 tablet 5    Cholecalciferol 2000 units TABS Once daily 30 tablet 5    sertraline (ZOLOFT) 100 MG tablet Take 100 mg by mouth 2 times daily.  traZODone (DESYREL) 50 MG tablet Take 50 mg by mouth as needed for Sleep.  acetaminophen (TYLENOL) 500 MG tablet Take 500 mg by mouth every 4 hours as needed for Pain. Data         BP (!) 148/62   Pulse 80   Temp 97.6 °F (36.4 °C)   Resp 16   Ht 5' 4\" (1.626 m)   Wt 180 lb (81.6 kg)   SpO2 100%   BMI 30.90 kg/m²     Wt Readings from Last 3 Encounters:   02/01/18 180 lb (81.6 kg)   01/02/18 180 lb (81.6 kg)   05/19/15 196 lb (88.9 kg)        Code Status: DNR-CCA with no intubation    ADVANCED CARE PLANNING:  Patient has capacity for medical decisions: yes  Health Care Power of : no  Living Will: yes as per patient but no records seen    Personal, Social, and Family History  Marital Status:   Living situation:alone  Importance of crispin/Rastafarian/spiritual beliefs: ? Very ? xSomewhat ? Not   Psychological Distress: mild  Does patient understand diagnosis/treatment?  yes  Does caregiver understand diagnosis/treatment? not asked      Assessment        REVIEW OF SYSTEMS  CONSTITUTIONAL:  positive for  fatigue, malaise, anorexia and weight loss, negative for  fevers, chills and sweats  EYES:  negative for  blurred vision, dry eyes, blind spots, eye discharge and visual disturbance  HEENT:  negative for  hearing loss, earaches, nasal congestion, epistaxis and sore mouth  RESPIRATORY:  negative for  dry cough, cough with sputum, dyspnea, wheezing and hemoptysis  CARDIOVASCULAR: Positive for fatigue, negative for  chest pain, dyspnea, palpitations, orthopnea, PND, exertional chest pressure/discomfort  GASTROINTESTINAL:  negative for change in bowel habits, diarrhea, be resuscitated if her heart stopped and nor does she want to be intubated in case her breathing stopped    - The patient said that if her heart stopped or breathing stopped then she should be allowed to pass away peacefully    - I along with patient's nurse Amor again discussed the different types of code status with the patient and she told that she wanted her code status to be changed to DNR CCA with no intubation    - I changed the patient's code status to DNR CCA with no intubation     - I also discussed with the patient that it may not be possible for her to go back home as of now and she will most likely be discharged to a nursing home/rehab facility to gain strength with the help of physical therapy and patient agreed to that      Education/support to staff  Education/support to patient  Discharge planning/helping to coordinate care  Communications with primary service  Caregiver support/education     Principle Problem/Diagnosis:  Left breast mass with possible liver metastasis     Additional assessments  Lactic acidosis  Intra-parenchymal contusion of brain  Recurrent falls  Depression  Anemia  Chronic thrombocytopenia  Sepsis due to UTI  Elevated LFTs    1- Symptom management/ pain control     Pain Assessment:  Pain is controlled with current analgesics. Medication(s) being used: acetaminophen. Anxiety:  fatigue                          Dyspnea:  none                          Fatigue:  Tiredness    We feel the patient symptoms are being controlled. her current regimen is reviewed by myself and discussed with the staff.        2- Goals of care evaluation   The patient goals of care are improve or maintain function/quality of life, provide comfort care/support/palliation/relieve suffering, accomplish a particular personal goal, spiritual needs, remain at home, strengthening relationships, preserve independence/autonomy/control and support for family/caregiver   Goals of care discussed with:

## 2018-02-05 NOTE — PROGRESS NOTES
[facility] Oncology Progress Note  2/5/2018 2:28 PM  Subjective:   Admit Date: 2/1/2018  PCP: Monica Lemon MD     Reason for consult:  Anemia/thrombocytopenia   Interval History: Patient is in her room with a sitter, concerns that she may want \"sleep and not wake up. \" She denies any bleeding. She has chronic anemia and thrombocytopenia since 2015, but did not follow up. She was admitted for fall. CT scan of abd/pelvis shows a large left breast mass measuring 4.4 cm with satellite lesion mesuring 1.1 cm, suspicious for primary breast cancer. She states she does not do self breast exams and has not had a mammogram in years. The liver showed liver lesions small and multiple and AFP is elevated at 11. She is refusing work up for liver lesions and breast mass, has met with palliative care and made herself DNR-CCA. Diet: Dietary Nutrition Supplements: Diabetic Oral Supplement  DIET GENERAL;  Medications:   Scheduled Meds:   sertraline  100 mg Oral Daily    sodium chloride flush  10 mL Intravenous BID    multivitamin  1 tablet Oral Daily    sodium chloride flush  10 mL Intravenous 2 times per day    sodium chloride  30 mL/kg Intravenous Once     Continuous Infusions:   CBC:   Recent Labs      02/02/18   1703  02/04/18   0547  02/05/18   0613   WBC  11.8*  6.5  7.5   HGB  8.7*  7.5*  7.8*   PLT  See Reflexed IPF Result  See Reflexed IPF Result  See Reflexed IPF Result     BMP:    Recent Labs      02/04/18   0547  02/05/18   0613   NA  135  136   K  3.8  4.1   CL  104  104   CO2  22  22   BUN  15  15   CREATININE  0.73  0.67   GLUCOSE  94  101*     Hepatic:   Recent Labs      02/03/18   0621  02/04/18   0547  02/05/18   0613   AST  78*  74*  67*   ALT  23  26  27   BILITOT  3.55*  3.13*  3.72*   ALKPHOS  69  76  80     INR:   No results for input(s): INR in the last 72 hours. AFP: 11.2  CT scan of chest/abd pelvis:  1.   Large left breast mass measuring up to 4.4 cm with satellite lesion measuring

## 2018-02-06 ENCOUNTER — TELEPHONE (OUTPATIENT)
Dept: INTERNAL MEDICINE | Age: 73
End: 2018-02-06

## 2018-02-06 VITALS
RESPIRATION RATE: 16 BRPM | HEART RATE: 63 BPM | OXYGEN SATURATION: 99 % | TEMPERATURE: 97.5 F | SYSTOLIC BLOOD PRESSURE: 132 MMHG | BODY MASS INDEX: 30.73 KG/M2 | HEIGHT: 64 IN | WEIGHT: 180 LBS | DIASTOLIC BLOOD PRESSURE: 38 MMHG

## 2018-02-06 LAB
ALBUMIN SERPL-MCNC: 2.2 G/DL (ref 3.5–5.2)
ALBUMIN/GLOBULIN RATIO: 0.8 (ref 1–2.5)
ALP BLD-CCNC: 81 U/L (ref 35–104)
ALT SERPL-CCNC: 26 U/L (ref 5–33)
ANION GAP SERPL CALCULATED.3IONS-SCNC: 9 MMOL/L (ref 9–17)
AST SERPL-CCNC: 62 U/L
BILIRUB SERPL-MCNC: 3.27 MG/DL (ref 0.3–1.2)
BILIRUBIN DIRECT: 1.72 MG/DL
BILIRUBIN, INDIRECT: 1.55 MG/DL (ref 0–1)
BUN BLDV-MCNC: 18 MG/DL (ref 8–23)
BUN/CREAT BLD: ABNORMAL (ref 9–20)
CALCIUM SERPL-MCNC: 8.3 MG/DL (ref 8.6–10.4)
CHLORIDE BLD-SCNC: 102 MMOL/L (ref 98–107)
CO2: 23 MMOL/L (ref 20–31)
CREAT SERPL-MCNC: 0.74 MG/DL (ref 0.5–0.9)
GFR AFRICAN AMERICAN: >60 ML/MIN
GFR NON-AFRICAN AMERICAN: >60 ML/MIN
GFR SERPL CREATININE-BSD FRML MDRD: ABNORMAL ML/MIN/{1.73_M2}
GFR SERPL CREATININE-BSD FRML MDRD: ABNORMAL ML/MIN/{1.73_M2}
GLOBULIN: ABNORMAL G/DL (ref 1.5–3.8)
GLUCOSE BLD-MCNC: 84 MG/DL (ref 70–99)
HCT VFR BLD CALC: 23.3 % (ref 36.3–47.1)
HEMOGLOBIN: 7.9 G/DL (ref 11.9–15.1)
MCH RBC QN AUTO: 38 PG (ref 25.2–33.5)
MCHC RBC AUTO-ENTMCNC: 33.9 G/DL (ref 28.4–34.8)
MCV RBC AUTO: 112 FL (ref 82.6–102.9)
NRBC AUTOMATED: 0 PER 100 WBC
PDW BLD-RTO: 20.9 % (ref 11.8–14.4)
PLATELET # BLD: ABNORMAL K/UL (ref 138–453)
PLATELET, FLUORESCENCE: 49 K/UL (ref 138–453)
PLATELET, IMMATURE FRACTION: 5.1 % (ref 1.1–10.3)
PMV BLD AUTO: ABNORMAL FL (ref 8.1–13.5)
POTASSIUM SERPL-SCNC: 4.2 MMOL/L (ref 3.7–5.3)
RBC # BLD: 2.08 M/UL (ref 3.95–5.11)
SODIUM BLD-SCNC: 134 MMOL/L (ref 135–144)
TOTAL PROTEIN: 5 G/DL (ref 6.4–8.3)
WBC # BLD: 6.9 K/UL (ref 3.5–11.3)

## 2018-02-06 PROCEDURE — 85027 COMPLETE CBC AUTOMATED: CPT

## 2018-02-06 PROCEDURE — 36415 COLL VENOUS BLD VENIPUNCTURE: CPT

## 2018-02-06 PROCEDURE — 94762 N-INVAS EAR/PLS OXIMTRY CONT: CPT

## 2018-02-06 PROCEDURE — 85055 RETICULATED PLATELET ASSAY: CPT

## 2018-02-06 PROCEDURE — 99238 HOSP IP/OBS DSCHRG MGMT 30/<: CPT | Performed by: INTERNAL MEDICINE

## 2018-02-06 PROCEDURE — 6370000000 HC RX 637 (ALT 250 FOR IP): Performed by: INTERNAL MEDICINE

## 2018-02-06 PROCEDURE — 6370000000 HC RX 637 (ALT 250 FOR IP): Performed by: PSYCHIATRY & NEUROLOGY

## 2018-02-06 PROCEDURE — 80048 BASIC METABOLIC PNL TOTAL CA: CPT

## 2018-02-06 PROCEDURE — 2580000003 HC RX 258: Performed by: INTERNAL MEDICINE

## 2018-02-06 PROCEDURE — 99232 SBSQ HOSP IP/OBS MODERATE 35: CPT | Performed by: FAMILY MEDICINE

## 2018-02-06 PROCEDURE — 80076 HEPATIC FUNCTION PANEL: CPT

## 2018-02-06 RX ORDER — HYDROCODONE BITARTRATE AND ACETAMINOPHEN 5; 325 MG/1; MG/1
1 TABLET ORAL EVERY 6 HOURS PRN
Qty: 12 TABLET | Refills: 0 | Status: SHIPPED | OUTPATIENT
Start: 2018-02-06 | End: 2018-02-13

## 2018-02-06 RX ADMIN — ACETAMINOPHEN 650 MG: 325 TABLET ORAL at 02:08

## 2018-02-06 RX ADMIN — Medication 10 ML: at 09:00

## 2018-02-06 RX ADMIN — SERTRALINE 100 MG: 50 TABLET, FILM COATED ORAL at 11:32

## 2018-02-06 ASSESSMENT — ENCOUNTER SYMPTOMS
EYES NEGATIVE: 1
GASTROINTESTINAL NEGATIVE: 1
CONSTIPATION: 0
SPUTUM PRODUCTION: 0
RESPIRATORY NEGATIVE: 1
SHORTNESS OF BREATH: 0
DIARRHEA: 0
ABDOMINAL PAIN: 0

## 2018-02-06 ASSESSMENT — PAIN SCALES - GENERAL
PAINLEVEL_OUTOF10: 3
PAINLEVEL_OUTOF10: 1

## 2018-02-06 NOTE — CARE COORDINATION
Discharge [facility] Case Management Department  Written by: Balta Saravia RN    Patient Name: Symone Ruggiero  Attending Provider: Destiney Guzman MD  Admit Date: 2018 11:01 AM  MRN: [id number]  Account: [de-identified]                     : 1945  Discharge Date: [id number]      Disposition: [facility]    Balta Saravia RN

## 2018-02-06 NOTE — ONCOLOGY
Patient refusing work up for possible malignancy  Will sign off please call us with any questions.    Discussed with Carmelita Rose

## 2018-02-06 NOTE — FLOWSHEET NOTE
DATE: 2018    NAME: Juliana Holter  MRN: [id number]   : 1945    Patient not seen this date for Physical Therapy due to:  [] Blood transfusion in progress  [] Hemodialysis  [x]  Patient Declined; pt in bed upon arrival. States, \"I am too tired. I just woke up, come back later\". Will check back time permitting.  [] Spine Precautions   [] Strict Bedrest  [] Surgery/ Procedure  [] Testing      [] Other        [] PT being discontinued at this time. Patient independent. No further needs. [] PT being discontinued at this time as the patient has been transferred to palliative care. No further needs.     Juan Pablo Elizabeth, PTA

## 2018-02-06 NOTE — PROGRESS NOTES
[facility]    Gastroenterology Daily Progress Note      Patient:   Loretta Mills   :    1945   Facility:   [facility]   Date:     2018  Consultant:   Rao Cooper CNP      Subjective:     74 y.o. female admitted 2018 with Intraparenchymal hemorrhage of brain ([facility] [address] [I61.9] and seen for abnormal LFTs. Patient seen and examined. No abdominal pain. Tolerating diet. She reports she does not want further workup for possible cancer.     Objective     Scheduled Meds:   sertraline  100 mg Oral Daily    sodium chloride flush  10 mL Intravenous BID    multivitamin  1 tablet Oral Daily    sodium chloride flush  10 mL Intravenous 2 times per day    sodium chloride  30 mL/kg Intravenous Once       Vital Signs:  BP (!) 132/38   Pulse 63   Temp 97.5 °F (36.4 °C)   Resp 16   Ht 5' 4\" (1.626 m)   Wt 180 lb (81.6 kg)   SpO2 99%   BMI 30.90 kg/m²      Physical Exam:   General appearance: Alert & oriented, NAD  Lungs: CTA bilaterally    Heart: S1S2 RRR  Abdomen: Soft, Nontender, Not distended, BS WNL  Skin: Mild jaundice, No clubbing, No cyanosis    Lab and Imaging Review     CBC  Recent Labs      18   0547  18   0613  18   0545   WBC  6.5  7.5  6.9   HGB  7.5*  7.8*  7.9*   HCT  22.7*  22.6*  23.3*   MCV  114.1*  109.7*  112.0*   PLT  See Reflexed IPF Result  See Reflexed IPF Result  See Reflexed IPF Result       BMP  Recent Labs      18   0547  18   0613  18   0545   NA  135  136  134*   K  3.8  4.1  4.2   CL  104  104  102   CO2  22  22  23   BUN  15  15  18   CREATININE  0.73  0.67  0.74   GLUCOSE  94  101*  84   CALCIUM  8.3*  8.3*  8.3*       LFTS  Recent Labs      18   0547  18   0613  18   0545   ALKPHOS  76  80  81   ALT  26  27  26   AST  74*  67*  62*   PROT  4.8*  5.0*  5.0*   BILITOT  3.13*  3.72*  3.27*   BILIDIR  1.32*  1.60*  1.72*   LABALBU  2.3*  2.6*  2.2*       ANEMIA

## 2018-02-06 NOTE — PROGRESS NOTES
[] well appearing      [] Intubated      [] ill appearing      [] Other:    Mental Status: [x] normal mental status exam      [] drowsy      [] Confused      [] Other:     Cardiovascular: [x]  Regular rate/rhythm      [] Arrhythmia      [] Other:     Chest: [x] Effort normal      [] lungs clear      [] respiratory distress      [] Tachypnea      []  Other:    Abdomen: [x] Soft/non-tender      [x]  Normal appearance      [] Distended      [] Ascites      [] Other:    Neurological: [x] Normal Speech      [x] Normal Sensation      []  Deficits present:      Extremity:  [x] normal skin color/temp      [] clubbing/cyanosis      []  No edema      [] Other:     Palliative Performance Scale:  __x_60%  Ambulation reduced; Significant disease; Can't do hobbies/housework; intake normal or reduced; occasional assist; LOC full/confusion  ___50%  Mainly sit/lie; Extensive disease; Can't do any work; Considerable assist; intake normal or reduced; LOC full/confusion  ___40%  Mainly in bed; Extensive disease; Mainly assist; intake normal or reduced; LOC full/confusion   ___30%  Bed Bound; Extensive disease; Total care; intake reduced; LOCfull/confusion  ___20%  Bed Bound; Extensive disease; Total care; intake minimal; Drowsy/coma  ___10%  Bed Bound; Extensive disease;  Total care; Mouth care only; Drowsy/coma  ___0       Death      Plan      Palliative Interaction:    - The patient was seen today    - I discussed patient's current medical condition with her and asked her what she understood regarding her disease process    - The patient told that she does not want any further testing/workup to be done to confirm that she has cancer    - The patient also told that if she has cancer when \"let it be\"and she does not want anyone to tell her that she has cancer    - I respected patient's wishes and told her that we will not discuss about her having cancer    - I encouraged the patient to take active part in the physical therapy    - I care/support/palliation/relieve suffering, accomplish a particular personal goal, spiritual needs, strengthening relationships, preserve independence/autonomy/control and support for family/caregiver   Goals of care discussed with:    [x] Patient independently    [] Patient and Family    [] Family or Healthcare DPOA independently    [] Unable to discuss with patient, family/DPOA not present    3- Code Status  DNR-CCA with no intubation    4- Other recommendations  - We will follow-up on the POA/living Will paperwork  - We will continue to provide comfort and support to the patient  Please call with any palliative questions or concerns. Palliative Care Team is available via perfect serve or via phone. Patient was seen with total face-to-face time of 30 minutes. More than 50% of this visit was counseling and/or education regarding PC as well as care coordination. Palliative Care will continue to follow Ms. Hilliard's care as needed. The note was dictated by laura, typing errors may be a possibility. Thank you for allowing Palliative Care to participate in the care of Ms. Jocelin Meza .        Electronically signed by   Jessica Vazquez MD  Palliative Care Team  on 2/6/2018 at 2:05 PM    Palliative care office: [phone]

## 2018-02-07 LAB
CULTURE: NORMAL
Lab: NORMAL
Lab: NORMAL
SPECIMEN DESCRIPTION: NORMAL
SPECIMEN DESCRIPTION: NORMAL
STATUS: NORMAL
STATUS: NORMAL

## 2018-02-08 LAB
ALBUMIN SERPL-MCNC: NORMAL G/DL (ref 3.5–5.2)
ALBUMIN/GLOBULIN RATIO: NORMAL (ref 1–2.5)
ALP BLD-CCNC: NORMAL U/L (ref 35–104)
ALT SERPL-CCNC: NORMAL U/L (ref 5–33)
ANION GAP SERPL CALCULATED.3IONS-SCNC: NORMAL MMOL/L (ref 9–17)
AST SERPL-CCNC: NORMAL U/L
BILIRUB SERPL-MCNC: NORMAL MG/DL (ref 0.3–1.2)
BUN BLDV-MCNC: NORMAL MG/DL (ref 8–23)
BUN/CREAT BLD: NORMAL (ref 9–20)
CALCIUM SERPL-MCNC: NORMAL MG/DL (ref 8.6–10.4)
CHLORIDE BLD-SCNC: NORMAL MMOL/L (ref 98–107)
CO2: NORMAL MMOL/L (ref 20–31)
CREAT SERPL-MCNC: NORMAL MG/DL (ref 0.5–0.9)
GFR AFRICAN AMERICAN: NORMAL ML/MIN
GFR NON-AFRICAN AMERICAN: NORMAL ML/MIN
GFR SERPL CREATININE-BSD FRML MDRD: NORMAL ML/MIN/{1.73_M2}
GFR SERPL CREATININE-BSD FRML MDRD: NORMAL ML/MIN/{1.73_M2}
GLUCOSE BLD-MCNC: NORMAL MG/DL (ref 70–99)
POTASSIUM SERPL-SCNC: NORMAL MMOL/L (ref 3.7–5.3)
SODIUM BLD-SCNC: NORMAL MMOL/L (ref 135–144)
TOTAL PROTEIN: NORMAL G/DL (ref 6.4–8.3)

## 2018-02-20 ENCOUNTER — OFFICE VISIT (OUTPATIENT)
Dept: ORTHOPEDIC SURGERY | Age: 73
End: 2018-02-20
Payer: COMMERCIAL

## 2018-02-20 VITALS
SYSTOLIC BLOOD PRESSURE: 114 MMHG | HEIGHT: 64 IN | WEIGHT: 179.9 LBS | DIASTOLIC BLOOD PRESSURE: 67 MMHG | BODY MASS INDEX: 30.71 KG/M2

## 2018-02-20 DIAGNOSIS — S42.292A OTHER CLOSED DISPLACED FRACTURE OF PROXIMAL END OF LEFT HUMERUS, INITIAL ENCOUNTER: Primary | ICD-10-CM

## 2018-02-20 PROCEDURE — 99024 POSTOP FOLLOW-UP VISIT: CPT | Performed by: ORTHOPAEDIC SURGERY

## 2018-02-20 PROCEDURE — 23600 CLTX PROX HUMRL FX W/O MNPJ: CPT | Performed by: ORTHOPAEDIC SURGERY

## 2018-02-20 RX ORDER — DOCUSATE SODIUM 100 MG/1
100 CAPSULE, LIQUID FILLED ORAL 2 TIMES DAILY
COMMUNITY

## 2018-02-20 NOTE — PROGRESS NOTES
Lili Rosenberg is a 57-year-old right hand dominant female who fell approximately 3 weeks ago while taking out her garbage. She suffered a left proximal humerus fracture. She was placed in an arm sling with instructions to follow up with me in 2-3 weeks. She is staying at [facility] and has a  here with her today. She is slightly confused although does answer questions appropriately and is able to provide most of the history. She states she has been in some pain although feels much better in the sling. She stays in the sling most of the time however occasionally takes it off. She has not moved her shoulder or elbow since her injury. Today she complains of pain in the shoulder. She is participating in therapy at the facility. Review of Systems   Constitutional: Negative for fever and chills. HENT: Negative for congestion and eye pain. Eyes: Negative for blurred vision and double vision. Respiratory: Negative for cough, shortness of breath and wheezing. Cardiovascular: Negative for chest pain and palpitations. Gastrointestinal: Negative for nausea. Negative for vomiting. Musculoskeletal: Positive for myalgias and joint pain left shoulder. Skin: Negative for itching and rash. Neurological: Negative for dizziness, sensory change and headaches. Psychiatric/Behavioral: Negative for depression and suicidal ideas. Physical exam:  Patient is alert and oriented ×3, no apparent distress. Slightly confused with some questioning  Head: Normocephalic atraumatic position. Left upper extremity:   + Pain with gentle range of motion of the left shoulder   + Pain with attempted terminal extension left elbow. Range of motion 20-95°. No pain with range of motion of the wrist   Extremity is neurovascularly intact    strength 5/5 bilaterally      X-rays: 3 views of the left shoulder were reviewed and compared to the previous films from January 31, 2017.     Findings: AP, scapular Y,

## 2018-03-07 DIAGNOSIS — S42.292D OTHER CLOSED DISPLACED FRACTURE OF PROXIMAL END OF LEFT HUMERUS WITH ROUTINE HEALING, SUBSEQUENT ENCOUNTER: Primary | ICD-10-CM

## 2018-03-11 NOTE — PROGRESS NOTES
Encounters:   02/01/18 180 lb (81.6 kg)   01/02/18 180 lb (81.6 kg)   05/19/15 196 lb (88.9 kg)       Physical Examination:   Physical Exam   Constitutional: She is oriented to person, place, and time. She appears well-developed and well-nourished. HENT:   Head: Normocephalic and atraumatic. Dental hygiene poor   Eyes: Pupils are equal, round, and reactive to light. Neck: Normal range of motion. Cardiovascular: Normal rate and regular rhythm. Murmur (AS murmur) heard. Pulmonary/Chest: Effort normal.   Abdominal: Soft. She exhibits no distension. There is no tenderness. Musculoskeletal: Normal range of motion. She exhibits no edema. Neurological: She is alert and oriented to person, place, and time. Skin: Skin is warm. Labs:-    CBC:   Recent Labs      02/04/18   0547  02/05/18   0613  02/06/18   0545   WBC  6.5  7.5  6.9   HGB  7.5*  7.8*  7.9*   PLT  See Reflexed IPF Result  See Reflexed IPF Result  See Reflexed IPF Result     BMP:    Recent Labs      02/04/18   0547  02/05/18   0613  02/06/18   0545   NA  135  136  134*   K  3.8  4.1  4.2   CL  104  104  102   CO2  22  22  23   BUN  15  15  18   CREATININE  0.73  0.67  0.74   GLUCOSE  94  101*  84     Calcium:  Recent Labs      02/06/18   0545   CALCIUM  8.3*     Ionized Calcium:No results for input(s): IONCA in the last 72 hours. Magnesium:No results for input(s): MG in the last 72 hours. Phosphorus:No results for input(s): PHOS in the last 72 hours. BNP:No results for input(s): BNP in the last 72 hours. Glucose:  Recent Labs      02/05/18   1152  02/05/18   1548  02/05/18   1920   POCGLU  111*  110*  146*     HgbA1C: No results for input(s): LABA1C in the last 72 hours. INR:   No results for input(s): INR in the last 72 hours.   Hepatic:   Recent Labs      02/06/18   0545   ALKPHOS  81   ALT  26   AST  62*   PROT  5.0*   BILITOT  3.27*   BILIDIR  1.72*   LABALBU  2.2*     Amylase and Lipase:  No results for input(s): LACTA, AMYLASE in the last 72 hours. Lactic Acid:   No results for input(s): LACTA in the last 72 hours. CARDIAC ENZYMES:  No results for input(s): CKTOTAL, CKMB, CKMBINDEX, TROPONINI in the last 72 hours. BNP: No results for input(s): BNP in the last 72 hours. Lipids: No results for input(s): CHOL, TRIG, HDL, LDLCALC in the last 72 hours.     Invalid input(s): LDL  ABGs: No results found for: PH, PCO2, PO2, HCO3, O2SAT  Thyroid:   Lab Results   Component Value Date    TSH 2.35 01/02/2018      Urinalysis:   Color, UA   Date Value Ref Range Status   02/01/2018 ORANGE (A) YEL Final     Comment:     INTERPRET WITH CAUTION DUE TO INTENSE COLOR OF URINE.     pH, UA   Date Value Ref Range Status   02/01/2018 5.0 5.0 - 8.0 Final     Specific Gravity, UA   Date Value Ref Range Status   02/01/2018 1.037 (H) 1.005 - 1.030 Final     Protein, UA   Date Value Ref Range Status   02/01/2018 TRACE (A) NEG Final     RBC, UA   Date Value Ref Range Status   02/01/2018 2 TO 5 0 - 2 /HPF Final     Bacteria, UA   Date Value Ref Range Status   02/01/2018 FEW (A) NONE Final     Nitrite, Urine   Date Value Ref Range Status   02/01/2018 POSITIVE (A) NEG Final     WBC, UA   Date Value Ref Range Status   02/01/2018 10 TO 20 0 - 5 /HPF Final     Leukocyte Esterase, Urine   Date Value Ref Range Status   02/01/2018 SMALL (A) NEG Final     Yeast, UA   Date Value Ref Range Status   02/01/2018 NOT REPORTED NONE Final     Glucose, Ur   Date Value Ref Range Status   02/01/2018 NEGATIVE NEG Final     Bilirubin Urine   Date Value Ref Range Status   02/01/2018 SMALL (A) NEG Final       Assessment:  Active Problems:    Depression    Lactic acidosis    Intraparenchymal hemorrhage of brain (HCC)    Recurrent falls    Thrombocytopenia (HCC)    Sepsis due to urinary tract infection (HCC)    Elevated LFTs      Plan:  Liver and breast mass  Will obtain breast biopsy- patient not agreeing at this time  Heme onc on board  NM bone scan to rule out bone metastases  MRI brain 158